# Patient Record
Sex: MALE | NOT HISPANIC OR LATINO | Employment: OTHER | URBAN - METROPOLITAN AREA
[De-identification: names, ages, dates, MRNs, and addresses within clinical notes are randomized per-mention and may not be internally consistent; named-entity substitution may affect disease eponyms.]

---

## 2023-09-06 ENCOUNTER — HOSPITAL ENCOUNTER (OUTPATIENT)
Dept: DATA CONVERSION | Facility: HOSPITAL | Age: 63
Discharge: HOME | End: 2023-09-06
Payer: COMMERCIAL

## 2023-09-06 DIAGNOSIS — R21 RASH AND OTHER NONSPECIFIC SKIN ERUPTION: ICD-10-CM

## 2023-09-06 DIAGNOSIS — R42 DIZZINESS AND GIDDINESS: ICD-10-CM

## 2023-09-06 DIAGNOSIS — Z12.5 ENCOUNTER FOR SCREENING FOR MALIGNANT NEOPLASM OF PROSTATE: ICD-10-CM

## 2023-09-06 LAB
ALBUMIN SERPL-MCNC: 4 GM/DL (ref 3.5–5)
ALBUMIN/GLOB SERPL: 1.3 RATIO (ref 1.5–3)
ALP BLD-CCNC: 91 U/L (ref 35–125)
ALT SERPL-CCNC: 33 U/L (ref 5–40)
ANION GAP SERPL CALCULATED.3IONS-SCNC: 10 MMOL/L (ref 0–19)
AST SERPL-CCNC: 30 U/L (ref 5–40)
BASOPHILS # BLD AUTO: 0.03 K/UL (ref 0–0.22)
BASOPHILS NFR BLD AUTO: 0.6 % (ref 0–1)
BILIRUB SERPL-MCNC: 0.7 MG/DL (ref 0.1–1.2)
BUN SERPL-MCNC: 27 MG/DL (ref 8–25)
BUN/CREAT SERPL: 30 RATIO (ref 8–21)
CALCIUM SERPL-MCNC: 8.9 MG/DL (ref 8.5–10.4)
CHLORIDE SERPL-SCNC: 106 MMOL/L (ref 97–107)
CO2 SERPL-SCNC: 24 MMOL/L (ref 24–31)
CREAT SERPL-MCNC: 0.9 MG/DL (ref 0.4–1.6)
DEPRECATED RDW RBC AUTO: 47.7 FL (ref 37–54)
DIFFERENTIAL METHOD BLD: ABNORMAL
EOSINOPHIL # BLD AUTO: 0.3 K/UL (ref 0–0.45)
EOSINOPHIL NFR BLD: 5.7 % (ref 0–3)
ERYTHROCYTE [DISTWIDTH] IN BLOOD BY AUTOMATED COUNT: 13.6 % (ref 11.7–15)
ERYTHROCYTE [SEDIMENTATION RATE] IN BLOOD BY WESTERGREN METHOD: 28 MM/HR (ref 0–20)
GFR SERPL CREATININE-BSD FRML MDRD: 97 ML/MIN/1.73 M2
GLOBULIN SER-MCNC: 3 G/DL (ref 1.9–3.7)
GLUCOSE SERPL-MCNC: 87 MG/DL (ref 65–99)
HCT VFR BLD AUTO: 43.5 % (ref 41–50)
HGB BLD-MCNC: 14.4 GM/DL (ref 13.5–16.5)
IMM GRANULOCYTES # BLD AUTO: 0.01 K/UL (ref 0–0.1)
LYMPHOCYTES # BLD AUTO: 1.64 K/UL (ref 1.2–3.2)
LYMPHOCYTES NFR BLD MANUAL: 31.2 % (ref 20–40)
MAGNESIUM SERPL-MCNC: 2.3 MG/DL (ref 1.6–3.1)
MCH RBC QN AUTO: 31.4 PG (ref 26–34)
MCHC RBC AUTO-ENTMCNC: 33.1 % (ref 31–37)
MCV RBC AUTO: 94.8 FL (ref 80–100)
MONOCYTES # BLD AUTO: 0.57 K/UL (ref 0–0.8)
MONOCYTES NFR BLD MANUAL: 10.8 % (ref 0–8)
NEUTROPHILS # BLD AUTO: 2.71 K/UL
NEUTROPHILS # BLD AUTO: 2.71 K/UL (ref 1.8–7.7)
NEUTROPHILS.IMMATURE NFR BLD: 0.2 % (ref 0–1)
NEUTS SEG NFR BLD: 51.5 % (ref 50–70)
NRBC BLD-RTO: 0 /100 WBC
PLATELET # BLD AUTO: 235 K/UL (ref 150–450)
PMV BLD AUTO: 11.6 CU (ref 7–12.6)
POTASSIUM SERPL-SCNC: 4.4 MMOL/L (ref 3.4–5.1)
PROT SERPL-MCNC: 7 G/DL (ref 5.9–7.9)
PSA SERPL-MCNC: 0.8 NG/ML (ref 0–4.1)
RBC # BLD AUTO: 4.59 M/UL (ref 4.5–5.5)
SODIUM SERPL-SCNC: 140 MMOL/L (ref 133–145)
WBC # BLD AUTO: 5.3 K/UL (ref 4.5–11)

## 2023-09-21 RX ORDER — HYDROCODONE BITARTRATE AND ACETAMINOPHEN 5; 325 MG/1; MG/1
TABLET ORAL
COMMUNITY
Start: 2023-09-05 | End: 2023-10-31 | Stop reason: ALTCHOICE

## 2023-09-21 RX ORDER — ACETAMINOPHEN 500 MG
500 TABLET ORAL EVERY 6 HOURS PRN
COMMUNITY
End: 2024-02-08 | Stop reason: WASHOUT

## 2023-10-25 ENCOUNTER — APPOINTMENT (OUTPATIENT)
Dept: PRIMARY CARE | Facility: CLINIC | Age: 63
End: 2023-10-25
Payer: COMMERCIAL

## 2023-10-30 RX ORDER — TRIAMCINOLONE ACETONIDE 1 MG/G
CREAM TOPICAL
COMMUNITY
Start: 2023-09-21 | End: 2023-10-31 | Stop reason: SDUPTHER

## 2023-10-31 ENCOUNTER — APPOINTMENT (OUTPATIENT)
Dept: PRIMARY CARE | Facility: CLINIC | Age: 63
End: 2023-10-31
Payer: COMMERCIAL

## 2023-10-31 ENCOUNTER — OFFICE VISIT (OUTPATIENT)
Dept: PRIMARY CARE | Facility: CLINIC | Age: 63
End: 2023-10-31
Payer: COMMERCIAL

## 2023-10-31 VITALS
OXYGEN SATURATION: 99 % | HEIGHT: 73 IN | HEART RATE: 57 BPM | TEMPERATURE: 97.5 F | SYSTOLIC BLOOD PRESSURE: 120 MMHG | BODY MASS INDEX: 29.66 KG/M2 | WEIGHT: 223.8 LBS | DIASTOLIC BLOOD PRESSURE: 72 MMHG

## 2023-10-31 DIAGNOSIS — Z00.00 WELL ADULT EXAM: ICD-10-CM

## 2023-10-31 DIAGNOSIS — E78.00 HYPERCHOLESTEROLEMIA: Primary | ICD-10-CM

## 2023-10-31 DIAGNOSIS — R21 RASH AND NONSPECIFIC SKIN ERUPTION: ICD-10-CM

## 2023-10-31 LAB
CHOLEST SERPL-MCNC: 248 MG/DL (ref 133–200)
CHOLEST/HDLC SERPL: 5.8 {RATIO}
HDLC SERPL-MCNC: 43 MG/DL
LDLC SERPL CALC-MCNC: 186 MG/DL (ref 65–130)
TRIGL SERPL-MCNC: 96 MG/DL (ref 40–150)

## 2023-10-31 PROCEDURE — 80061 LIPID PANEL: CPT

## 2023-10-31 PROCEDURE — 99396 PREV VISIT EST AGE 40-64: CPT | Performed by: NURSE PRACTITIONER

## 2023-10-31 PROCEDURE — 3008F BODY MASS INDEX DOCD: CPT | Performed by: NURSE PRACTITIONER

## 2023-10-31 PROCEDURE — 36415 COLL VENOUS BLD VENIPUNCTURE: CPT

## 2023-10-31 RX ORDER — TRIAMCINOLONE ACETONIDE 1 MG/G
CREAM TOPICAL 2 TIMES DAILY
Qty: 45 G | Refills: 3 | Status: SHIPPED | OUTPATIENT
Start: 2023-10-31 | End: 2024-01-22 | Stop reason: ALTCHOICE

## 2023-10-31 ASSESSMENT — ENCOUNTER SYMPTOMS
ALLERGIC/IMMUNOLOGIC NEGATIVE: 1
HEMATURIA: 0
SORE THROAT: 0
ACTIVITY CHANGE: 0
DIFFICULTY URINATING: 0
DIZZINESS: 0
CHEST TIGHTNESS: 0
DIARRHEA: 0
CARDIOVASCULAR NEGATIVE: 1
APPETITE CHANGE: 0
RESPIRATORY NEGATIVE: 1
CONSTIPATION: 0
WHEEZING: 0
LIGHT-HEADEDNESS: 0
AGITATION: 0
GASTROINTESTINAL NEGATIVE: 1
COUGH: 0
HEADACHES: 0
ARTHRALGIAS: 0
DYSURIA: 0
NERVOUS/ANXIOUS: 0
SHORTNESS OF BREATH: 0
EYES NEGATIVE: 1
FREQUENCY: 0
BRUISES/BLEEDS EASILY: 0
BLOOD IN STOOL: 0
ENDOCRINE NEGATIVE: 1
EYE DISCHARGE: 0
EYE REDNESS: 0
JOINT SWELLING: 0
OCCASIONAL FEELINGS OF UNSTEADINESS: 0
ADENOPATHY: 0
ABDOMINAL PAIN: 0
TREMORS: 0
PALPITATIONS: 0
NEUROLOGICAL NEGATIVE: 1
LOSS OF SENSATION IN FEET: 0
DEPRESSION: 0

## 2023-10-31 ASSESSMENT — PATIENT HEALTH QUESTIONNAIRE - PHQ9
SUM OF ALL RESPONSES TO PHQ9 QUESTIONS 1 AND 2: 0
2. FEELING DOWN, DEPRESSED OR HOPELESS: NOT AT ALL
1. LITTLE INTEREST OR PLEASURE IN DOING THINGS: NOT AT ALL

## 2023-10-31 ASSESSMENT — PAIN SCALES - GENERAL: PAINLEVEL: 0-NO PAIN

## 2023-10-31 NOTE — PROGRESS NOTES
"Subjective   Patient ID: Dirk Gibson is a 62 y.o. male who presents for Annual Exam.    Presents today for physical exam.  He denies new concerns today.  He reports that the dizziness and lightheadedness he was experiencing this summer has resolved with increase in fluid intake and decrease and spending time out in the heat.  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  He reports he is able to keep up well with the younger coworkers that he works with.         Review of Systems   Constitutional:  Negative for activity change and appetite change.   HENT:  Negative for congestion, ear pain, hearing loss and sore throat.    Eyes: Negative.  Negative for discharge, redness and visual disturbance.   Respiratory: Negative.  Negative for cough, chest tightness, shortness of breath and wheezing.    Cardiovascular: Negative.  Negative for chest pain, palpitations and leg swelling.   Gastrointestinal: Negative.  Negative for abdominal pain, blood in stool, constipation and diarrhea.   Endocrine: Negative.    Genitourinary: Negative.  Negative for difficulty urinating, dysuria, frequency, hematuria, penile discharge and testicular pain.   Musculoskeletal:  Negative for arthralgias and joint swelling.   Skin:  Negative for rash.   Allergic/Immunologic: Negative.    Neurological: Negative.  Negative for dizziness, tremors, light-headedness and headaches.   Hematological:  Negative for adenopathy. Does not bruise/bleed easily.   Psychiatric/Behavioral:  Negative for agitation. The patient is not nervous/anxious.        Objective   /72 (BP Location: Left arm, Patient Position: Sitting)   Pulse 57   Temp 36.4 °C (97.5 °F) (Temporal)   Ht 1.854 m (6' 1\")   Wt 102 kg (223 lb 12.8 oz)   SpO2 99%   BMI 29.53 kg/m²     Physical Exam  Constitutional:       General: He is not in acute distress.     Appearance: Normal appearance.   HENT:      Head: Normocephalic.      Right Ear: Tympanic membrane normal.      " Left Ear: Tympanic membrane normal.      Nose: Nose normal.      Mouth/Throat:      Mouth: Mucous membranes are moist.   Eyes:      Conjunctiva/sclera: Conjunctivae normal.      Pupils: Pupils are equal, round, and reactive to light.   Cardiovascular:      Rate and Rhythm: Normal rate and regular rhythm.      Pulses: Normal pulses.      Heart sounds: Normal heart sounds.   Pulmonary:      Effort: Pulmonary effort is normal.      Breath sounds: Normal breath sounds.   Abdominal:      General: Bowel sounds are normal.      Palpations: Abdomen is soft.   Musculoskeletal:         General: Normal range of motion.   Skin:     General: Skin is warm and dry.      Capillary Refill: Capillary refill takes less than 2 seconds.   Neurological:      Mental Status: He is alert and oriented to person, place, and time.   Psychiatric:         Mood and Affect: Mood normal.         Speech: Speech normal.         Assessment/Plan   Problem List Items Addressed This Visit    None  Visit Diagnoses         Codes    Hypercholesterolemia    -  Primary E78.00    Relevant Orders    Lipid panel    Rash and nonspecific skin eruption     R21    Relevant Medications    triamcinolone (Kenalog) 0.1 % cream    Well adult exam     Z00.00    Relevant Orders    Follow Up In Primary Care - Established          Labs obtained today.  Will address results when available.  Focus on healthy eating including lean proteins and vegetables.  Avoid frequent meals with high carbohydrate high sugar foods and drinks.  Try to increase physical activity.  Goal is for 160 minutes per week of physical activity.  Make sure to keep your fluid intake up.  Consider adding Gatorade or other electrolyte replacement fluids once a day especially on hot days where you are sweating a lot and working a lot.  Check blood pressure 2-3 times a week.  Call for blood pressures greater than 140/90.  Bring list of blood pressures to next visit.  Continue to use Kenalog cream as needed to  affected areas.  Call for worsening or not improving symptoms

## 2023-10-31 NOTE — PATIENT INSTRUCTIONS
Focus on healthy eating including lean proteins and vegetables.  Avoid high carbohydrate high sugar foods and drinks.  Try to increase physical activity as tolerated.  Goal is for 160 minutes/week of physical activity.  .  Make sure to keep your fluid intake up.  Consider adding Gatorade or other electrolyte replacement fluids once a day especially on hot days where you are sweating a lot and working a lot.  Check blood pressure 2-3 times a week.  Call for blood pressures greater than 140/90.  Bring list of blood pressures to next visit.  Labs obtained today, will address results when availableContinue to use Kenalog cream as needed to affected areas.  Call for worsening or not improving symptoms

## 2024-01-22 ENCOUNTER — OFFICE VISIT (OUTPATIENT)
Dept: PRIMARY CARE | Facility: CLINIC | Age: 64
End: 2024-01-22
Payer: COMMERCIAL

## 2024-01-22 ENCOUNTER — LAB (OUTPATIENT)
Dept: LAB | Facility: LAB | Age: 64
End: 2024-01-22
Payer: COMMERCIAL

## 2024-01-22 VITALS
BODY MASS INDEX: 29.55 KG/M2 | SYSTOLIC BLOOD PRESSURE: 134 MMHG | OXYGEN SATURATION: 99 % | DIASTOLIC BLOOD PRESSURE: 82 MMHG | WEIGHT: 224 LBS | HEART RATE: 68 BPM | TEMPERATURE: 97.8 F

## 2024-01-22 DIAGNOSIS — F17.210 CIGARETTE NICOTINE DEPENDENCE WITHOUT COMPLICATION: ICD-10-CM

## 2024-01-22 DIAGNOSIS — R42 DIZZINESS: ICD-10-CM

## 2024-01-22 DIAGNOSIS — R42 LIGHTHEADEDNESS: ICD-10-CM

## 2024-01-22 DIAGNOSIS — R53.83 OTHER FATIGUE: ICD-10-CM

## 2024-01-22 DIAGNOSIS — Z00.00 ENCOUNTER FOR MEDICAL EXAMINATION TO ESTABLISH CARE: ICD-10-CM

## 2024-01-22 DIAGNOSIS — Z00.00 ENCOUNTER FOR MEDICAL EXAMINATION TO ESTABLISH CARE: Primary | ICD-10-CM

## 2024-01-22 LAB
25(OH)D3 SERPL-MCNC: 35 NG/ML (ref 30–100)
ALBUMIN SERPL BCP-MCNC: 3.9 G/DL (ref 3.4–5)
ALP SERPL-CCNC: 76 U/L (ref 33–136)
ALT SERPL W P-5'-P-CCNC: 17 U/L (ref 10–52)
ANION GAP SERPL CALC-SCNC: 13 MMOL/L (ref 10–20)
AST SERPL W P-5'-P-CCNC: 17 U/L (ref 9–39)
BASOPHILS # BLD AUTO: 0.04 X10*3/UL (ref 0–0.1)
BASOPHILS NFR BLD AUTO: 0.5 %
BILIRUB SERPL-MCNC: 1.5 MG/DL (ref 0–1.2)
BUN SERPL-MCNC: 14 MG/DL (ref 6–23)
CALCIUM SERPL-MCNC: 9 MG/DL (ref 8.6–10.3)
CHLORIDE SERPL-SCNC: 101 MMOL/L (ref 98–107)
CHOLEST SERPL-MCNC: 229 MG/DL (ref 0–199)
CHOLESTEROL/HDL RATIO: 5.8
CO2 SERPL-SCNC: 30 MMOL/L (ref 21–32)
CREAT SERPL-MCNC: 0.9 MG/DL (ref 0.5–1.3)
EGFRCR SERPLBLD CKD-EPI 2021: >90 ML/MIN/1.73M*2
EOSINOPHIL # BLD AUTO: 0.36 X10*3/UL (ref 0–0.7)
EOSINOPHIL NFR BLD AUTO: 4.9 %
ERYTHROCYTE [DISTWIDTH] IN BLOOD BY AUTOMATED COUNT: 13.2 % (ref 11.5–14.5)
EST. AVERAGE GLUCOSE BLD GHB EST-MCNC: 120 MG/DL
GLUCOSE SERPL-MCNC: 95 MG/DL (ref 74–99)
HBA1C MFR BLD: 5.8 %
HCT VFR BLD AUTO: 46 % (ref 41–52)
HDLC SERPL-MCNC: 39.8 MG/DL
HGB BLD-MCNC: 15 G/DL (ref 13.5–17.5)
IMM GRANULOCYTES # BLD AUTO: 0.03 X10*3/UL (ref 0–0.7)
IMM GRANULOCYTES NFR BLD AUTO: 0.4 % (ref 0–0.9)
LDLC SERPL CALC-MCNC: 160 MG/DL
LYMPHOCYTES # BLD AUTO: 2.01 X10*3/UL (ref 1.2–4.8)
LYMPHOCYTES NFR BLD AUTO: 27.2 %
MCH RBC QN AUTO: 30.2 PG (ref 26–34)
MCHC RBC AUTO-ENTMCNC: 32.6 G/DL (ref 32–36)
MCV RBC AUTO: 93 FL (ref 80–100)
MONOCYTES # BLD AUTO: 0.68 X10*3/UL (ref 0.1–1)
MONOCYTES NFR BLD AUTO: 9.2 %
NEUTROPHILS # BLD AUTO: 4.28 X10*3/UL (ref 1.2–7.7)
NEUTROPHILS NFR BLD AUTO: 57.8 %
NON HDL CHOLESTEROL: 189 MG/DL (ref 0–149)
NRBC BLD-RTO: 0 /100 WBCS (ref 0–0)
PLATELET # BLD AUTO: 261 X10*3/UL (ref 150–450)
POTASSIUM SERPL-SCNC: 4.5 MMOL/L (ref 3.5–5.3)
PROT SERPL-MCNC: 6.1 G/DL (ref 6.4–8.2)
RBC # BLD AUTO: 4.97 X10*6/UL (ref 4.5–5.9)
SODIUM SERPL-SCNC: 139 MMOL/L (ref 136–145)
TRIGL SERPL-MCNC: 144 MG/DL (ref 0–149)
TSH SERPL-ACNC: 1.39 MIU/L (ref 0.44–3.98)
VIT B12 SERPL-MCNC: 233 PG/ML (ref 211–911)
VLDL: 29 MG/DL (ref 0–40)
WBC # BLD AUTO: 7.4 X10*3/UL (ref 4.4–11.3)

## 2024-01-22 PROCEDURE — 84443 ASSAY THYROID STIM HORMONE: CPT

## 2024-01-22 PROCEDURE — 3008F BODY MASS INDEX DOCD: CPT | Performed by: STUDENT IN AN ORGANIZED HEALTH CARE EDUCATION/TRAINING PROGRAM

## 2024-01-22 PROCEDURE — 99406 BEHAV CHNG SMOKING 3-10 MIN: CPT | Performed by: STUDENT IN AN ORGANIZED HEALTH CARE EDUCATION/TRAINING PROGRAM

## 2024-01-22 PROCEDURE — 80061 LIPID PANEL: CPT

## 2024-01-22 PROCEDURE — 36415 COLL VENOUS BLD VENIPUNCTURE: CPT

## 2024-01-22 PROCEDURE — 85025 COMPLETE CBC W/AUTO DIFF WBC: CPT

## 2024-01-22 PROCEDURE — 80053 COMPREHEN METABOLIC PANEL: CPT

## 2024-01-22 PROCEDURE — 99214 OFFICE O/P EST MOD 30 MIN: CPT | Performed by: STUDENT IN AN ORGANIZED HEALTH CARE EDUCATION/TRAINING PROGRAM

## 2024-01-22 PROCEDURE — 82306 VITAMIN D 25 HYDROXY: CPT

## 2024-01-22 PROCEDURE — 83036 HEMOGLOBIN GLYCOSYLATED A1C: CPT

## 2024-01-22 PROCEDURE — 82607 VITAMIN B-12: CPT

## 2024-01-30 ENCOUNTER — DOCUMENTATION (OUTPATIENT)
Dept: PRIMARY CARE | Facility: CLINIC | Age: 64
End: 2024-01-30
Payer: COMMERCIAL

## 2024-01-30 ENCOUNTER — APPOINTMENT (OUTPATIENT)
Dept: RADIOLOGY | Facility: HOSPITAL | Age: 64
End: 2024-01-30
Payer: COMMERCIAL

## 2024-01-30 ENCOUNTER — HOSPITAL ENCOUNTER (OUTPATIENT)
Dept: VASCULAR MEDICINE | Facility: HOSPITAL | Age: 64
Discharge: HOME | End: 2024-01-30
Payer: COMMERCIAL

## 2024-01-30 ENCOUNTER — HOSPITAL ENCOUNTER (OUTPATIENT)
Dept: RADIOLOGY | Facility: HOSPITAL | Age: 64
Discharge: HOME | End: 2024-01-30
Payer: COMMERCIAL

## 2024-01-30 ENCOUNTER — HOSPITAL ENCOUNTER (EMERGENCY)
Facility: HOSPITAL | Age: 64
Discharge: HOME | End: 2024-01-30
Attending: STUDENT IN AN ORGANIZED HEALTH CARE EDUCATION/TRAINING PROGRAM
Payer: COMMERCIAL

## 2024-01-30 VITALS
WEIGHT: 224 LBS | TEMPERATURE: 97.9 F | SYSTOLIC BLOOD PRESSURE: 148 MMHG | DIASTOLIC BLOOD PRESSURE: 84 MMHG | OXYGEN SATURATION: 96 % | HEART RATE: 60 BPM | RESPIRATION RATE: 18 BRPM | BODY MASS INDEX: 30.34 KG/M2 | HEIGHT: 72 IN

## 2024-01-30 DIAGNOSIS — F17.210 CIGARETTE NICOTINE DEPENDENCE WITHOUT COMPLICATION: ICD-10-CM

## 2024-01-30 DIAGNOSIS — I65.23 CAROTID STENOSIS, BILATERAL: Primary | ICD-10-CM

## 2024-01-30 DIAGNOSIS — R42 DIZZINESS: ICD-10-CM

## 2024-01-30 LAB
ALBUMIN SERPL BCP-MCNC: 4.1 G/DL (ref 3.4–5)
ANION GAP SERPL CALC-SCNC: 14 MMOL/L (ref 10–20)
BASOPHILS # BLD AUTO: 0.04 X10*3/UL (ref 0–0.1)
BASOPHILS NFR BLD AUTO: 0.6 %
BUN SERPL-MCNC: 14 MG/DL (ref 6–23)
CALCIUM SERPL-MCNC: 9.1 MG/DL (ref 8.6–10.3)
CHLORIDE SERPL-SCNC: 100 MMOL/L (ref 98–107)
CO2 SERPL-SCNC: 31 MMOL/L (ref 21–32)
CREAT SERPL-MCNC: 0.86 MG/DL (ref 0.5–1.3)
EGFRCR SERPLBLD CKD-EPI 2021: >90 ML/MIN/1.73M*2
EOSINOPHIL # BLD AUTO: 0.47 X10*3/UL (ref 0–0.7)
EOSINOPHIL NFR BLD AUTO: 6.7 %
ERYTHROCYTE [DISTWIDTH] IN BLOOD BY AUTOMATED COUNT: 13.3 % (ref 11.5–14.5)
GLUCOSE SERPL-MCNC: 77 MG/DL (ref 74–99)
HCT VFR BLD AUTO: 44.5 % (ref 41–52)
HGB BLD-MCNC: 14.9 G/DL (ref 13.5–17.5)
IMM GRANULOCYTES # BLD AUTO: 0.02 X10*3/UL (ref 0–0.7)
IMM GRANULOCYTES NFR BLD AUTO: 0.3 % (ref 0–0.9)
LYMPHOCYTES # BLD AUTO: 2.22 X10*3/UL (ref 1.2–4.8)
LYMPHOCYTES NFR BLD AUTO: 31.9 %
MCH RBC QN AUTO: 30.6 PG (ref 26–34)
MCHC RBC AUTO-ENTMCNC: 33.5 G/DL (ref 32–36)
MCV RBC AUTO: 91 FL (ref 80–100)
MONOCYTES # BLD AUTO: 0.69 X10*3/UL (ref 0.1–1)
MONOCYTES NFR BLD AUTO: 9.9 %
NEUTROPHILS # BLD AUTO: 3.53 X10*3/UL (ref 1.2–7.7)
NEUTROPHILS NFR BLD AUTO: 50.6 %
NRBC BLD-RTO: 0 /100 WBCS (ref 0–0)
PHOSPHATE SERPL-MCNC: 4.4 MG/DL (ref 2.5–4.9)
PLATELET # BLD AUTO: 264 X10*3/UL (ref 150–450)
POTASSIUM SERPL-SCNC: 4.1 MMOL/L (ref 3.5–5.3)
RBC # BLD AUTO: 4.87 X10*6/UL (ref 4.5–5.9)
SODIUM SERPL-SCNC: 141 MMOL/L (ref 136–145)
WBC # BLD AUTO: 7 X10*3/UL (ref 4.4–11.3)

## 2024-01-30 PROCEDURE — 2550000001 HC RX 255 CONTRASTS: Performed by: STUDENT IN AN ORGANIZED HEALTH CARE EDUCATION/TRAINING PROGRAM

## 2024-01-30 PROCEDURE — 99284 EMERGENCY DEPT VISIT MOD MDM: CPT | Performed by: STUDENT IN AN ORGANIZED HEALTH CARE EDUCATION/TRAINING PROGRAM

## 2024-01-30 PROCEDURE — 71271 CT THORAX LUNG CANCER SCR C-: CPT | Performed by: RADIOLOGY

## 2024-01-30 PROCEDURE — 70498 CT ANGIOGRAPHY NECK: CPT

## 2024-01-30 PROCEDURE — 70496 CT ANGIOGRAPHY HEAD: CPT | Performed by: RADIOLOGY

## 2024-01-30 PROCEDURE — 71271 CT THORAX LUNG CANCER SCR C-: CPT

## 2024-01-30 PROCEDURE — 36415 COLL VENOUS BLD VENIPUNCTURE: CPT | Performed by: STUDENT IN AN ORGANIZED HEALTH CARE EDUCATION/TRAINING PROGRAM

## 2024-01-30 PROCEDURE — 93880 EXTRACRANIAL BILAT STUDY: CPT | Performed by: SURGERY

## 2024-01-30 PROCEDURE — 70498 CT ANGIOGRAPHY NECK: CPT | Performed by: RADIOLOGY

## 2024-01-30 PROCEDURE — 93880 EXTRACRANIAL BILAT STUDY: CPT

## 2024-01-30 PROCEDURE — 85025 COMPLETE CBC W/AUTO DIFF WBC: CPT | Performed by: STUDENT IN AN ORGANIZED HEALTH CARE EDUCATION/TRAINING PROGRAM

## 2024-01-30 PROCEDURE — 80069 RENAL FUNCTION PANEL: CPT | Performed by: STUDENT IN AN ORGANIZED HEALTH CARE EDUCATION/TRAINING PROGRAM

## 2024-01-30 PROCEDURE — 70496 CT ANGIOGRAPHY HEAD: CPT

## 2024-01-30 RX ORDER — NAPROXEN SODIUM 220 MG/1
81 TABLET, FILM COATED ORAL DAILY
Qty: 360 TABLET | Refills: 0 | Status: SHIPPED | OUTPATIENT
Start: 2024-01-30 | End: 2025-01-29

## 2024-01-30 RX ORDER — ATORVASTATIN CALCIUM 10 MG/1
40 TABLET, FILM COATED ORAL DAILY
Qty: 80 TABLET | Refills: 0 | Status: SHIPPED | OUTPATIENT
Start: 2024-01-30 | End: 2024-02-08 | Stop reason: WASHOUT

## 2024-01-30 RX ORDER — CLOPIDOGREL BISULFATE 75 MG/1
75 TABLET ORAL DAILY
Qty: 20 TABLET | Refills: 0 | Status: SHIPPED | OUTPATIENT
Start: 2024-01-30 | End: 2024-02-19 | Stop reason: SDUPTHER

## 2024-01-30 RX ADMIN — IOHEXOL 84 ML: 350 INJECTION, SOLUTION INTRAVENOUS at 19:34

## 2024-01-30 ASSESSMENT — LIFESTYLE VARIABLES
EVER FELT BAD OR GUILTY ABOUT YOUR DRINKING: NO
HAVE PEOPLE ANNOYED YOU BY CRITICIZING YOUR DRINKING: NO
EVER HAD A DRINK FIRST THING IN THE MORNING TO STEADY YOUR NERVES TO GET RID OF A HANGOVER: NO
HAVE YOU EVER FELT YOU SHOULD CUT DOWN ON YOUR DRINKING: NO

## 2024-01-30 ASSESSMENT — COLUMBIA-SUICIDE SEVERITY RATING SCALE - C-SSRS
6. HAVE YOU EVER DONE ANYTHING, STARTED TO DO ANYTHING, OR PREPARED TO DO ANYTHING TO END YOUR LIFE?: NO
1. IN THE PAST MONTH, HAVE YOU WISHED YOU WERE DEAD OR WISHED YOU COULD GO TO SLEEP AND NOT WAKE UP?: NO
2. HAVE YOU ACTUALLY HAD ANY THOUGHTS OF KILLING YOURSELF?: NO

## 2024-01-30 ASSESSMENT — PAIN - FUNCTIONAL ASSESSMENT: PAIN_FUNCTIONAL_ASSESSMENT: 0-10

## 2024-01-30 ASSESSMENT — PAIN SCALES - GENERAL: PAINLEVEL_OUTOF10: 0 - NO PAIN

## 2024-01-30 NOTE — ED TRIAGE NOTES
Patient here for dizziness. Hasn't been dizzy for 2 weeks but had carotid ultrasound done and was had 70% carotid stenosis bilaterally

## 2024-01-30 NOTE — PROGRESS NOTES
Spoke with patient's wife, who is the emergency contact, regarding Dirk's carotid ultrasound results, which showed bilateral 70% stenosis. Given the fact he is symptomatic with lightheadedness and dizziness, I recommend he goes to the ER for further evaluation. Patient agrees to plan. Offered to call EMS but patient declined. [facility] notified that patient will be coming in for the reason stated above. Instructed patient to follow up in office within one week of ER visit and/or discharge.     Sher Helton MD

## 2024-01-30 NOTE — ED PROVIDER NOTES
HPI   Chief Complaint   Patient presents with    Dizziness     Hasn't been dizzy for 2 weeks but had carotid ultrasound done and was had 70% carotid stenosis bilaterally        HPI  63-year-old male with history of significant smoking history (~1 PPD x 40 years) who presents after abnormal radiology result.  Patient reports intermittent lightheadedness over the last 6 months.  He states the symptoms typically brought on by exertion and describes them as lightheadedness like he might pass out in addition to intermittent tongue numbness, general weakness.  Given the symptoms, patient had carotid duplex as an outpatient earlier today that revealed greater than 70% stenosis of the bilateral carotid arteries for which his PCP instructed him to present to the emergency department.  Patient denies any lightheadedness or similar symptoms at the moment.  He states he has been asymptomatic for the last 3 weeks.  He denies any headache, weakness, numbness, tingling.                  [place] Coma Scale Score: 15                  Patient History   No past medical history on file.  Past Surgical History:   Procedure Laterality Date    HAND SURGERY Left      Family History   Problem Relation Name Age of Onset    No Known Problems Mother      Leukemia Father      Diabetes Father      Cancer Father      No Known Problems Sister      No Known Problems Brother      No Known Problems Daughter      No Known Problems Son      No Known Problems Son      No Known Problems Son      No Known Problems Son      No Known Problems Maternal Grandmother      No Known Problems Maternal Grandfather      No Known Problems Paternal Grandmother      No Known Problems Paternal Grandfather       Social History     Tobacco Use    Smoking status: Every Day     Packs/day: 1     Types: Cigarettes     Start date: 1/1/1970    Smokeless tobacco: Never   Vaping Use    Vaping Use: Never used   Substance Use Topics    Alcohol use: Not Currently    Drug use: Never        Physical Exam   ED Triage Vitals [01/30/24 1515]   Temperature Heart Rate Respirations BP   36.6 °C (97.9 °F) 62 18 147/76      Pulse Ox Temp Source Heart Rate Source Patient Position   98 % Skin Monitor Lying      BP Location FiO2 (%)     Right arm --       Physical Exam  Vitals and nursing note reviewed.   Constitutional:       Appearance: Normal appearance.   HENT:      Head: Normocephalic.      Mouth/Throat:      Mouth: Mucous membranes are moist.   Eyes:      Conjunctiva/sclera: Conjunctivae normal.   Cardiovascular:      Rate and Rhythm: Normal rate.   Pulmonary:      Effort: Pulmonary effort is normal.   Abdominal:      General: Abdomen is flat.   Neurological:      Mental Status: He is alert and oriented to person, place, and time.      Cranial Nerves: No cranial nerve deficit.      Sensory: No sensory deficit.      Motor: No weakness.      Coordination: Coordination normal. Finger-Nose-Finger Test and Heel to Shin Test normal.      Gait: Gait normal.   Psychiatric:         Mood and Affect: Mood normal.         ED Course & MDM   ED Course as of 01/30/24 2238   Tue Jan 30, 2024 2129 CT angio head and neck w and wo IV contrast  Critical stenosis of the proximal left cervical ICA up to 99% as well as ~50% stenosis of the right cervical ICA [ARAM]      ED Course User Index  [ARAM] Ricardo Gonzalez, DO         Diagnoses as of 01/30/24 2238   Carotid stenosis, bilateral       Medical Decision Making  63-year-old male with history of 40-pack-year smoking history who presents after an outpatient carotid duplex revealed bilateral carotid artery stenoses greater than 70%.  Patient has had intermittent symptoms over the last 6 months including lightheadedness, intermittent tongue numbness, occasional dysmetria but patient reports no symptoms over the last 3 weeks and denies any symptoms at the time my exam.  On exam, patient is mildly hypertensive but otherwise vitals within normal limits, he is in no acute distress  and nontoxic-appearing, neurologically he is intact with no dysdiadochokinesia, no dysmetria, no aphasia or dysarthria.  We discussed the patient's symptoms and outpatient testing with on-call vascular surgeon, Dr. Umaña, who recommended CT angio head and neck with plan to see the patient as an outpatient following.    CT angio showed up to 99% stenosis of the left cervical ICA as well as approximate 50% stenosis of the right cervical ICA.  Findings relayed to Dr. Umaña and patient instructed to follow-up as soon as possible.  Patient started on aspirin, clopidogrel, atorvastatin, per Dr. Umaña's recommendations and discharged home in stable condition with instructions to return for any strokelike symptoms.    Procedure  Procedures     Ricardo Gonzalez,   Resident  01/30/24 9765

## 2024-01-30 NOTE — DISCHARGE INSTRUCTIONS
You presented to the emergency department after your carotid ultrasound showed carotid artery stenosis on both sides.  We spoke with the vascular surgeon, Dr. Joe Umaña, who asked that we obtained a CT of your head and neck and started you on aspirin, Plavix, atorvastatin in the meantime.  The CT imaging showed 99% stenosis of the left internal carotid artery and about 50% stenosis of the right carotid artery.    You should follow-up with Dr. Umaña as soon as you can by scheduling appointment at [phone].  We made his staff aware of you so they may already be in the process of scheduling appointment.  Please return to the emergency department if you have any strokelike symptoms such as facial droop, slurred speech, arm weakness, or for any other reason.

## 2024-02-01 DIAGNOSIS — I65.23 SYMPTOMATIC STENOSIS OF BOTH CAROTID ARTERIES: Primary | ICD-10-CM

## 2024-02-07 ENCOUNTER — APPOINTMENT (OUTPATIENT)
Dept: VASCULAR SURGERY | Facility: HOSPITAL | Age: 64
End: 2024-02-07
Payer: COMMERCIAL

## 2024-02-08 ENCOUNTER — APPOINTMENT (OUTPATIENT)
Dept: RADIOLOGY | Facility: HOSPITAL | Age: 64
End: 2024-02-08
Payer: COMMERCIAL

## 2024-02-08 ENCOUNTER — APPOINTMENT (OUTPATIENT)
Dept: VASCULAR MEDICINE | Facility: HOSPITAL | Age: 64
End: 2024-02-08
Payer: COMMERCIAL

## 2024-02-08 ENCOUNTER — OFFICE VISIT (OUTPATIENT)
Dept: PRIMARY CARE | Facility: CLINIC | Age: 64
End: 2024-02-08
Payer: COMMERCIAL

## 2024-02-08 VITALS
BODY MASS INDEX: 31.69 KG/M2 | HEIGHT: 72 IN | HEART RATE: 56 BPM | TEMPERATURE: 98.7 F | SYSTOLIC BLOOD PRESSURE: 110 MMHG | DIASTOLIC BLOOD PRESSURE: 68 MMHG | WEIGHT: 234 LBS | OXYGEN SATURATION: 98 %

## 2024-02-08 DIAGNOSIS — R93.89 ABNORMAL CT OF THE CHEST: ICD-10-CM

## 2024-02-08 DIAGNOSIS — E78.00 HYPERCHOLESTEROLEMIA: Primary | ICD-10-CM

## 2024-02-08 PROCEDURE — 99214 OFFICE O/P EST MOD 30 MIN: CPT | Performed by: STUDENT IN AN ORGANIZED HEALTH CARE EDUCATION/TRAINING PROGRAM

## 2024-02-08 PROCEDURE — 3008F BODY MASS INDEX DOCD: CPT | Performed by: STUDENT IN AN ORGANIZED HEALTH CARE EDUCATION/TRAINING PROGRAM

## 2024-02-08 RX ORDER — ROSUVASTATIN CALCIUM 20 MG/1
20 TABLET, COATED ORAL DAILY
Qty: 90 TABLET | Refills: 1 | Status: SHIPPED | OUTPATIENT
Start: 2024-02-08 | End: 2024-03-03 | Stop reason: HOSPADM

## 2024-02-09 NOTE — PROGRESS NOTES
Subjective   Patient ID: Dirk Gibson is a 63 y.o. male who presents for No chief complaint on file..  HPI  He is a 63 years old man who presented today to the office for follow-up carotid stenosis  Patient mentioned that has been symptomatic recently because he has not been very active.  When he is active his symptoms starting with lightheaded and dizziness.    Denies new onset headaches, fever, chills, n/v/d, chest pain, SOB, abdominal pain, urinary symptoms, and lower extremity edema.     Review of Systems  All other systems have been reviewed and are negative.    Visit Vitals  /68   Pulse 56   Temp 37.1 °C (98.7 °F)   Ht 1.829 m (6')   Wt 106 kg (234 lb)   SpO2 98%   BMI 31.74 kg/m²   Smoking Status Former   BSA 2.32 m²       Objective   Physical Exam  General: Alert and oriented. Appears well-nourished and in no acute distress.  Eyes: PERRLA. EOMI.  Head/neck: Normocephalic. Supple.  Lymphatics: No cervical lymphadenopathy.  Respiratory/Thorax: Clear to auscultation bilaterally. No wheezing.   Cardiovascular: Regular rate and rhythm. No murmurs.  Gastrointestinal: Soft, nontender, nondistended. +BS   Musculoskeletal: ROM intact. No joint swelling. Normal strength   Extremities: Warm and well perfused. No peripheral edema.  Neurological: No gross neurologic deficits.   Psychological: Appropriate mood and affect.   Skin: No visible rashes or lesions.     Assessment/Plan   He is 63 year old male who presented today for follow up on carotid stenosis.     # carotid stenosis  -patient had carotid ultrasound and ct angiogram which were positive for bilateral stenosis.  -Ordered today rosuvastatin 20 mg  -Patient on aspirin 81 mg and Plavix 75 mg daily  -Follow-up appointment with neuro surgeon for possibly intervention    # Lung nodule  -Recommendation from the previous CT scan were to follow-up with CT scan in 3 months  -Order CT scan in the future    # Health maintenance  -Patient declined all  immunization  -Decline colonoscopy.    No red flags. Follow up in  3 months to discuss the results of CT scan and CMP since he is on rosuvastatin    Problem List Items Addressed This Visit    None  Visit Diagnoses       Hypercholesterolemia    -  Primary    Relevant Medications    rosuvastatin (Crestor) 20 mg tablet    Other Relevant Orders    Comprehensive metabolic panel    Abnormal CT of the chest        Relevant Orders    CT chest w IV contrast            I have personally reviewed all available pertinent labs, imaging, and consult notes with the patient.     All questions and concerns were addressed. Patient verbalizes understanding instructions and agrees with established plan of care.     I discussed the plan with Dr.Zen Vera Rodriguez MD  Family medicine resident  PGY2

## 2024-02-13 ENCOUNTER — APPOINTMENT (OUTPATIENT)
Dept: VASCULAR SURGERY | Facility: CLINIC | Age: 64
End: 2024-02-13
Payer: COMMERCIAL

## 2024-02-15 ENCOUNTER — OFFICE VISIT (OUTPATIENT)
Dept: VASCULAR SURGERY | Facility: CLINIC | Age: 64
End: 2024-02-15
Payer: COMMERCIAL

## 2024-02-15 VITALS — BODY MASS INDEX: 31.74 KG/M2 | WEIGHT: 234 LBS | DIASTOLIC BLOOD PRESSURE: 78 MMHG | SYSTOLIC BLOOD PRESSURE: 122 MMHG

## 2024-02-15 DIAGNOSIS — Z72.0 TOBACCO ABUSE: ICD-10-CM

## 2024-02-15 DIAGNOSIS — I65.23 BILATERAL CAROTID ARTERY STENOSIS: Primary | ICD-10-CM

## 2024-02-15 PROCEDURE — 3008F BODY MASS INDEX DOCD: CPT | Performed by: NURSE PRACTITIONER

## 2024-02-15 PROCEDURE — 99213 OFFICE O/P EST LOW 20 MIN: CPT | Performed by: NURSE PRACTITIONER

## 2024-02-15 PROCEDURE — 99203 OFFICE O/P NEW LOW 30 MIN: CPT | Performed by: NURSE PRACTITIONER

## 2024-02-15 ASSESSMENT — ENCOUNTER SYMPTOMS
LOSS OF SENSATION IN FEET: 0
DEPRESSION: 0
OCCASIONAL FEELINGS OF UNSTEADINESS: 0

## 2024-02-15 ASSESSMENT — PAIN SCALES - GENERAL: PAINLEVEL: 0-NO PAIN

## 2024-02-15 NOTE — PROGRESS NOTES
"Dirk Gibson [birth year]  NPV Carotid Artery Stenosis     On ASA, Plavix and statin    Patient evaluated with his wife present.  He endorses episodes of lightheadedness at work which prompted him to be seen by his primary care physician who then ordered a carotid duplex and a CT angio head and neck.     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  He also had 1 episode where his right leg fell asleep for several hours.  His wife states that when he had the episodes of lightheadedness he would also have increased confusion and \"garbled speech \", he also had episodes of stumbling.  He has not had any episodes since January but he also has not been working.  He was started on aspirin and Plavix which she is currently taking.  He denies amaurosis fugax, unilateral weakness or difficulty with speech on my exam.  Patient is right-handed.  45-pack-year smoker quit in January of this year.      Constitutional:  Alert and oriented to person, place, date/time in no acute distress.  HEENT:  Atraumatic, normocephalic. PERRL. EOMI.  Nares patent.  Mucous membranes moist.  Smile symmetric.  Tongue slightly deviates to the right.   Neck:  Trachea midline.  Respiratory:  Clear to auscultation.  Cardiac:  Regular rate and rhythm.  No murmurs.  Cardiovascular:  No edema of the extremities.  Pulse exam: Radial and femoral pulses palpable bilateral.   Abdominal:  Soft, nontender, nondistended, bowel sounds present.  Musculoskeletal:  Moves extremities freely.  Muscle grade strength 5 out of 5 bilaterally  Dermatological: Clean and dry   Neurological: Alert and oriented to person, place, date/time  Psych:  Calm, cooperative      1/30/24:  Carotid Duplex**CRITICAL RESULT**  Critical Result: > 70% stenosis bilateral ICA  Notification called to Dr. Helton on 1/30/2024 at 12:01:54 PM by Edith JTEER.     CONCLUSIONS:  Right Carotid: Findings are consistent with greater " than 70% stenosis of the right proximal internal carotid artery. Turbulent flow seen by color Doppler. Right external carotid artery appears patent with no evidence of stenosis. The right vertebral artery is patent with antegrade flow. There are elevated velocities in the right subclavian artery that are suggestive of disease.  Left Carotid: Findings are consistent with greater than 70% stenosis of the left proximal internal carotid artery. Turbulent flow seen by color Doppler. Left mid and distal ICA waveforms appear pre-occlusive. Additional imaging if clinically indicated. Left external carotid artery appears patent with no evidence of stenosis. The left vertebral artery is patent with antegrade flow. There are elevated velocities in the left subclavian artery that are suggestive of disease. Monophasic waveforms with low flow noted in mid and distal left ICA. Stenosis may be underestimated due to calcified shoadowing plaque.     Imaging & Doppler Findings:  Right Plaque Morph: The proximal right internal carotid artery demonstrates heterogenous and calcified plaque. The proximal right external carotid artery demonstrates heterogenous and calcified plaque. The distal right common carotid artery demonstrates heterogenous and calcified plaque.  Left Plaque Morph: The proximal left internal carotid artery demonstrates heterogenous and calcified plaque. The mid left internal carotid artery demonstrates heterogenous plaque. The proximal left external carotid artery demonstrates heterogenous and calcified plaque. The distal left common carotid artery demonstrates heterogenous and calcified plaque.      Right                         Left    PSV      EDV                 PSV      EDV  145 cm/s            CCA P    120 cm/s  97 cm/s             CCA D    80 cm/s  550 cm/s 166 cm/s   ICA P    479 cm/s 179 cm/s  280 cm/s            ICA M    21 cm/s  107 cm/s            ICA D    17 cm/s  158 cm/s             ECA     136 cm/s  77  cm/s           Vertebral  63 cm/s  261 cm/s          Subclavian 256 cm/s                   Right Left  ICA/CCA Ratio  5.7  6.0      1/30/24 CTA Head and Neck    Right carotid vessels: The common carotid artery is normal.  Atherosclerotic changes of carotid bulb. The internal carotid artery  in the neck is normal. There is 50% stenosis  by NASCET criteria.      Left carotid vessels: The common carotid artery is normal.  Atherosclerotic changes of the carotid bulb noted. Severe  atherosclerotic disease present within the proximal left cervical ICA  over a course of 2.2 cm with near complete occlusion with  noncalcified plaque noted (series 507, image 57). The mid to distal  left cervical ICA is diffusely diminutive in caliber to moderate  degree.      Vertebral vessels: Asymmetric diminutive left vertebral artery which  may be developmental. Otherwise the visualized segments of the  cervical vertebral arteries are patent.      There is critical stenosis of the proximal left cervical ICA (up to  99%) with partial reconstitution and asymmetric diminutive caliber of  the mid to distal left cervical ICA. Vascular consult recommended.      Approximately 50% stenosis of the right cervical ICA.      No evidence for significant stenosis or large branch vessel cutoffs  of the intracranial vessels.    Assessment and plan  Carotid artery stenosis      Ultrasound and CT angio with runoff reviewed by Dr. Umaña on 2/14/2024.  Patient's left proximal ICA is almost occluded.  Right proximal ICA velocity 550/166, mid ICA to 80, distal , ratio 5.7.  Our office will reach out to patient regarding PAT plan is for right carotid endarterectomy, likely next week.  Patient to continue current medications.  Avoid strenuous activity.  Blood pressure control.  Continue smoking cessation.  Our office will reach out to the patient regarding preadmission testing and surgical date.  Patient and wife wish to forego virtual meeting with   [place]

## 2024-02-15 NOTE — H&P (VIEW-ONLY)
"Dirk Gibson [birth year]  NPV Carotid Artery Stenosis     On ASA, Plavix and statin    Patient evaluated with his wife present.  He endorses episodes of lightheadedness at work which prompted him to be seen by his primary care physician who then ordered a carotid duplex and a CT angio head and neck.     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  He also had 1 episode where his right leg fell asleep for several hours.  His wife states that when he had the episodes of lightheadedness he would also have increased confusion and \"garbled speech \", he also had episodes of stumbling.  He has not had any episodes since January but he also has not been working.  He was started on aspirin and Plavix which she is currently taking.  He denies amaurosis fugax, unilateral weakness or difficulty with speech on my exam.  Patient is right-handed.  45-pack-year smoker quit in January of this year.      Constitutional:  Alert and oriented to person, place, date/time in no acute distress.  HEENT:  Atraumatic, normocephalic. PERRL. EOMI.  Nares patent.  Mucous membranes moist.  Smile symmetric.  Tongue slightly deviates to the right.   Neck:  Trachea midline.  Respiratory:  Clear to auscultation.  Cardiac:  Regular rate and rhythm.  No murmurs.  Cardiovascular:  No edema of the extremities.  Pulse exam: Radial and femoral pulses palpable bilateral.   Abdominal:  Soft, nontender, nondistended, bowel sounds present.  Musculoskeletal:  Moves extremities freely.  Muscle grade strength 5 out of 5 bilaterally  Dermatological: Clean and dry   Neurological: Alert and oriented to person, place, date/time  Psych:  Calm, cooperative      1/30/24:  Carotid Duplex**CRITICAL RESULT**  Critical Result: > 70% stenosis bilateral ICA  Notification called to Dr. Helton on 1/30/2024 at 12:01:54 PM by Edith JETER.     CONCLUSIONS:  Right Carotid: Findings are consistent with greater " than 70% stenosis of the right proximal internal carotid artery. Turbulent flow seen by color Doppler. Right external carotid artery appears patent with no evidence of stenosis. The right vertebral artery is patent with antegrade flow. There are elevated velocities in the right subclavian artery that are suggestive of disease.  Left Carotid: Findings are consistent with greater than 70% stenosis of the left proximal internal carotid artery. Turbulent flow seen by color Doppler. Left mid and distal ICA waveforms appear pre-occlusive. Additional imaging if clinically indicated. Left external carotid artery appears patent with no evidence of stenosis. The left vertebral artery is patent with antegrade flow. There are elevated velocities in the left subclavian artery that are suggestive of disease. Monophasic waveforms with low flow noted in mid and distal left ICA. Stenosis may be underestimated due to calcified shoadowing plaque.     Imaging & Doppler Findings:  Right Plaque Morph: The proximal right internal carotid artery demonstrates heterogenous and calcified plaque. The proximal right external carotid artery demonstrates heterogenous and calcified plaque. The distal right common carotid artery demonstrates heterogenous and calcified plaque.  Left Plaque Morph: The proximal left internal carotid artery demonstrates heterogenous and calcified plaque. The mid left internal carotid artery demonstrates heterogenous plaque. The proximal left external carotid artery demonstrates heterogenous and calcified plaque. The distal left common carotid artery demonstrates heterogenous and calcified plaque.      Right                         Left    PSV      EDV                 PSV      EDV  145 cm/s            CCA P    120 cm/s  97 cm/s             CCA D    80 cm/s  550 cm/s 166 cm/s   ICA P    479 cm/s 179 cm/s  280 cm/s            ICA M    21 cm/s  107 cm/s            ICA D    17 cm/s  158 cm/s             ECA     136 cm/s  77  cm/s           Vertebral  63 cm/s  261 cm/s          Subclavian 256 cm/s                   Right Left  ICA/CCA Ratio  5.7  6.0      1/30/24 CTA Head and Neck    Right carotid vessels: The common carotid artery is normal.  Atherosclerotic changes of carotid bulb. The internal carotid artery  in the neck is normal. There is 50% stenosis  by NASCET criteria.      Left carotid vessels: The common carotid artery is normal.  Atherosclerotic changes of the carotid bulb noted. Severe  atherosclerotic disease present within the proximal left cervical ICA  over a course of 2.2 cm with near complete occlusion with  noncalcified plaque noted (series 507, image 57). The mid to distal  left cervical ICA is diffusely diminutive in caliber to moderate  degree.      Vertebral vessels: Asymmetric diminutive left vertebral artery which  may be developmental. Otherwise the visualized segments of the  cervical vertebral arteries are patent.      There is critical stenosis of the proximal left cervical ICA (up to  99%) with partial reconstitution and asymmetric diminutive caliber of  the mid to distal left cervical ICA. Vascular consult recommended.      Approximately 50% stenosis of the right cervical ICA.      No evidence for significant stenosis or large branch vessel cutoffs  of the intracranial vessels.    Assessment and plan  Carotid artery stenosis      Ultrasound and CT angio with runoff reviewed by Dr. Umaña on 2/14/2024.  Patient's left proximal ICA is almost occluded.  Right proximal ICA velocity 550/166, mid ICA to 80, distal , ratio 5.7.  Our office will reach out to patient regarding PAT plan is for right carotid endarterectomy, likely next week.  Patient to continue current medications.  Avoid strenuous activity.  Blood pressure control.  Continue smoking cessation.  Our office will reach out to the patient regarding preadmission testing and surgical date.  Patient and wife wish to forego virtual meeting with   [place]

## 2024-02-19 ENCOUNTER — TELEPHONE (OUTPATIENT)
Dept: PRIMARY CARE | Facility: CLINIC | Age: 64
End: 2024-02-19
Payer: COMMERCIAL

## 2024-02-19 DIAGNOSIS — I65.23 CAROTID STENOSIS, BILATERAL: ICD-10-CM

## 2024-02-19 RX ORDER — CLOPIDOGREL BISULFATE 75 MG/1
75 TABLET ORAL DAILY
Qty: 20 TABLET | Refills: 0 | Status: SHIPPED | OUTPATIENT
Start: 2024-02-19 | End: 2024-03-03 | Stop reason: HOSPADM

## 2024-02-19 NOTE — TELEPHONE ENCOUNTER
Please let him know I refilled it. He needs to check with his surgeon about holding Plavix prior to surgery. Thanks.

## 2024-02-19 NOTE — TELEPHONE ENCOUNTER
Patient only has one plavix left.  They are in the process of scheduling his surgery.  She thought that you were sending in a prescription for him but I don't see one in the system.

## 2024-02-20 DIAGNOSIS — I65.23 CAROTID ARTERY STENOSIS WITHOUT CEREBRAL INFARCTION, BILATERAL: Primary | ICD-10-CM

## 2024-02-28 ENCOUNTER — LAB (OUTPATIENT)
Dept: LAB | Facility: LAB | Age: 64
End: 2024-02-28
Payer: COMMERCIAL

## 2024-02-28 ENCOUNTER — OFFICE VISIT (OUTPATIENT)
Dept: CARDIOLOGY | Facility: HOSPITAL | Age: 64
End: 2024-02-28
Payer: COMMERCIAL

## 2024-02-28 ENCOUNTER — PRE-ADMISSION TESTING (OUTPATIENT)
Dept: PREADMISSION TESTING | Facility: HOSPITAL | Age: 64
End: 2024-02-28
Payer: COMMERCIAL

## 2024-02-28 VITALS
WEIGHT: 246.03 LBS | DIASTOLIC BLOOD PRESSURE: 81 MMHG | BODY MASS INDEX: 33.37 KG/M2 | HEART RATE: 56 BPM | SYSTOLIC BLOOD PRESSURE: 126 MMHG | OXYGEN SATURATION: 99 %

## 2024-02-28 VITALS
OXYGEN SATURATION: 98 % | BODY MASS INDEX: 33.59 KG/M2 | DIASTOLIC BLOOD PRESSURE: 75 MMHG | SYSTOLIC BLOOD PRESSURE: 143 MMHG | HEIGHT: 72 IN | WEIGHT: 248.02 LBS | HEART RATE: 64 BPM | TEMPERATURE: 97.9 F | RESPIRATION RATE: 16 BRPM

## 2024-02-28 DIAGNOSIS — I65.23 CAROTID ARTERY STENOSIS WITHOUT CEREBRAL INFARCTION, BILATERAL: Primary | ICD-10-CM

## 2024-02-28 DIAGNOSIS — Z01.818 PREOP TESTING: Primary | ICD-10-CM

## 2024-02-28 DIAGNOSIS — Z01.818 PREOP TESTING: ICD-10-CM

## 2024-02-28 LAB
ABO GROUP (TYPE) IN BLOOD: NORMAL
ANTIBODY SCREEN: NORMAL
APPEARANCE UR: CLEAR
APTT PPP: 26.9 SECONDS (ref 22–32.5)
BILIRUB UR STRIP.AUTO-MCNC: NEGATIVE MG/DL
COLOR UR: NORMAL
GLUCOSE UR STRIP.AUTO-MCNC: NORMAL MG/DL
INR PPP: 1 (ref 0.9–1.2)
KETONES UR STRIP.AUTO-MCNC: NEGATIVE MG/DL
LEUKOCYTE ESTERASE UR QL STRIP.AUTO: NEGATIVE
NITRITE UR QL STRIP.AUTO: NEGATIVE
PH UR STRIP.AUTO: 5 [PH]
PROT UR STRIP.AUTO-MCNC: NEGATIVE MG/DL
PROTHROMBIN TIME: 10.5 SECONDS (ref 9.3–12.7)
RBC # UR STRIP.AUTO: NEGATIVE /UL
RH FACTOR (ANTIGEN D): NORMAL
SP GR UR STRIP.AUTO: 1.02
UROBILINOGEN UR STRIP.AUTO-MCNC: NORMAL MG/DL

## 2024-02-28 PROCEDURE — 3008F BODY MASS INDEX DOCD: CPT | Performed by: NURSE PRACTITIONER

## 2024-02-28 PROCEDURE — 86900 BLOOD TYPING SEROLOGIC ABO: CPT

## 2024-02-28 PROCEDURE — 93010 ELECTROCARDIOGRAM REPORT: CPT | Performed by: INTERNAL MEDICINE

## 2024-02-28 PROCEDURE — 85730 THROMBOPLASTIN TIME PARTIAL: CPT

## 2024-02-28 PROCEDURE — 85610 PROTHROMBIN TIME: CPT

## 2024-02-28 PROCEDURE — 87081 CULTURE SCREEN ONLY: CPT | Mod: WESLAB

## 2024-02-28 PROCEDURE — 86850 RBC ANTIBODY SCREEN: CPT

## 2024-02-28 PROCEDURE — 99203 OFFICE O/P NEW LOW 30 MIN: CPT | Performed by: NURSE PRACTITIONER

## 2024-02-28 PROCEDURE — 99205 OFFICE O/P NEW HI 60 MIN: CPT | Performed by: NURSE PRACTITIONER

## 2024-02-28 PROCEDURE — 93005 ELECTROCARDIOGRAM TRACING: CPT | Performed by: NURSE PRACTITIONER

## 2024-02-28 PROCEDURE — 81003 URINALYSIS AUTO W/O SCOPE: CPT

## 2024-02-28 PROCEDURE — 99213 OFFICE O/P EST LOW 20 MIN: CPT | Mod: 25 | Performed by: NURSE PRACTITIONER

## 2024-02-28 PROCEDURE — 36415 COLL VENOUS BLD VENIPUNCTURE: CPT

## 2024-02-28 PROCEDURE — 86901 BLOOD TYPING SEROLOGIC RH(D): CPT

## 2024-02-28 RX ORDER — CHLORHEXIDINE GLUCONATE ORAL RINSE 1.2 MG/ML
SOLUTION DENTAL
Qty: 473 ML | Refills: 0 | Status: SHIPPED | OUTPATIENT
Start: 2024-02-29 | End: 2024-03-03 | Stop reason: HOSPADM

## 2024-02-28 ASSESSMENT — ENCOUNTER SYMPTOMS
CONSTITUTIONAL NEGATIVE: 1
EYES NEGATIVE: 1
GASTROINTESTINAL NEGATIVE: 1
PSYCHIATRIC NEGATIVE: 1
MUSCULOSKELETAL NEGATIVE: 1
GASTROINTESTINAL NEGATIVE: 1
CARDIOVASCULAR NEGATIVE: 1
MYALGIAS: 1
RESPIRATORY NEGATIVE: 1
PSYCHIATRIC NEGATIVE: 1
CARDIOVASCULAR NEGATIVE: 1
NEUROLOGICAL NEGATIVE: 1
ENDOCRINE NEGATIVE: 1
EYES NEGATIVE: 1
DEPRESSION: 0
HEMATOLOGIC/LYMPHATIC NEGATIVE: 1
LIGHT-HEADEDNESS: 1
RESPIRATORY NEGATIVE: 1
CONSTITUTIONAL NEGATIVE: 1
HEMATOLOGIC/LYMPHATIC NEGATIVE: 1

## 2024-02-28 ASSESSMENT — DUKE ACTIVITY SCORE INDEX (DASI)
CAN YOU WALK INDOORS, SUCH AS AROUND YOUR HOUSE: YES
CAN YOU DO HEAVY WORK AROUND THE HOUSE LIKE SCRUBBING FLOORS OR LIFTING AND MOVING HEAVY FURNITURE: YES
CAN YOU DO LIGHT WORK AROUND THE HOUSE LIKE DUSTING OR WASHING DISHES: YES
CAN YOU CLIMB A FLIGHT OF STAIRS OR WALK UP A HILL: YES
CAN YOU PARTICIPATE IN STRENOUS SPORTS LIKE SWIMMING, SINGLES TENNIS, FOOTBALL, BASKETBALL, OR SKIING: NO
CAN YOU TAKE CARE OF YOURSELF (EAT, DRESS, BATHE, OR USE TOILET): YES
CAN YOU DO YARD WORK LIKE RAKING LEAVES, WEEDING OR PUSHING A MOWER: YES
TOTAL_SCORE: 50.7
CAN YOU WALK A BLOCK OR TWO ON LEVEL GROUND: YES
CAN YOU RUN A SHORT DISTANCE: YES
DASI METS SCORE: 9
CAN YOU PARTICIPATE IN MODERATE RECREATIONAL ACTIVITIES LIKE GOLF, BOWLING, DANCING, DOUBLES TENNIS OR THROWING A BASEBALL OR FOOTBALL: YES
CAN YOU HAVE SEXUAL RELATIONS: YES
CAN YOU DO MODERATE WORK AROUND THE HOUSE LIKE VACUUMING, SWEEPING FLOORS OR CARRYING GROCERIES: YES

## 2024-02-28 ASSESSMENT — CHADS2 SCORE
HYPERTENSION: NO
AGE GREATER THAN OR EQUAL TO 75: NO
PRIOR STROKE OR TIA OR THROMBOEMBOLISM: NO
CHF: NO
DIABETES: NO
PRIOR STROKE OR TIA OR THROMBOEMBOLISM: NO
AGE GREATER THAN OR EQUAL TO 75: NO
CHF: NO
DIABETES: NO
CHADS2 SCORE: 0
CHADS2 SCORE: 0
HYPERTENSION: NO

## 2024-02-28 ASSESSMENT — PAIN SCALES - GENERAL: PAINLEVEL_OUTOF10: 0 - NO PAIN

## 2024-02-28 ASSESSMENT — PAIN - FUNCTIONAL ASSESSMENT: PAIN_FUNCTIONAL_ASSESSMENT: 0-10

## 2024-02-28 NOTE — PREPROCEDURE INSTRUCTIONS
Medication List            Accurate as of February 28, 2024  1:01 PM. Always use your most recent med list.                aspirin 81 mg chewable tablet  Chew 1 tablet (81 mg) once daily.  Medication Adjustments for Surgery: Continue until night before surgery     clopidogrel 75 mg tablet  Commonly known as: Plavix  Take 1 tablet (75 mg) by mouth once daily for 20 days.  Medication Adjustments for Surgery: Continue until night before surgery     rosuvastatin 20 mg tablet  Commonly known as: Crestor  Take 1 tablet (20 mg) by mouth once daily.  Medication Adjustments for Surgery: Continue until night before surgery                              NPO Instructions:    Do not eat any food after midnight the night before your surgery/procedure.    Additional Instructions:     Day of Surgery:  Wear  comfortable loose fitting clothing  Do not use moisturizers, creams, lotions or perfume  All jewelry and valuables should be left at home   Home Preoperative Antibacterial Shower    What is a home preoperative antibacterial shower?  This shower is a way of cleaning the skin with a germ killing solution before surgery. The solution contains chlorhexidine, commonly known as CHG. CHG is a skin cleanser with germ killing ability. Let your doctor know if you are allergic to chlorhexidine.      Why do I need to take a preoperative antibacterial shower?  Skin is not sterile. It is best to try to make your skin as free of germs as possible before surgery. Proper cleansing with a germ killing soap before surgery can lower the number of germs on your skin. This helps to reduce the risk of infection at the surgical site. Following the instructions listed below will help you prepare your skin for surgery.    How do I use the solution?      Steps: Begin using your CHG soap TODAY FEB 28  __________________________________________________.  First, wash and rinse your hair  Keep CHG away soap away from ear canals and eyes.  Rinse completely,  do not condition. Hair extensions should be removed.  Wash your face with your normal soap and rinse.  Apply the CHG solution to a clean wet washcloth. Turn the water off or move away from the water spray to avoid premature rinsing of the CHG soap as you are applying.  Firmly lather your entire body from neck down. Do not use on face.  Pay special attention to the areas(s) where your incision(s) will be located unless they are on your face.  Avoid scrubbing your skin too hard.  The important point is to have the CHG soap sit on your skin for 3 minutes.  DO NOT wash with regular soap after you have used the CHG soap solution.  Pat yourself dry with a clean, freshly laundered towel.  DO NOT apply powders, deodorants or lotions.  Dress in clean, freshly laundered night clothes.  Be sure to sleep with clean, freshly laundered sheets.  Be aware that CHG will cause stains on fabrics; if you wash them with bleach after use. Rinse your washcloth and other linens that have contact with CHG completely. Use only non-chlorine detergents to launder the items used.  The morning of surgery is the fifth day. Repeat the above steps and dress in clean comfortable clothing.   Patient Information: Oral/Dental Rinse    What is oral/dental rinse?  It is a mouthwash. It is a way of cleaning the mouth with a germ killing solution before your surgery. The solution contains chlorhexidine, commonly know as CHG.  It is used inside the mouth to kill bacteria known as Staphylococcus aureus.  Let your doctor know if you are allergic to chlorhexidine.    Why do I need to use CHG oral/dental rinse?  The CHG oral/dental rinse helps to kill bacteria in your mouth known as Staphylococcus aureus. This reduces the risk of infection at the surgical site.    Using your CHG oral/dental rinse. AT YOUR PHARMACY  STEPS: use your CHG oral/dental rinse after you brush your teeth the night before (at bedtime) and the morning of your surgery. Follow the  directions on your prescription label.  Use the cap on the container to measure 15ml (fill cap to fill line)  Swish (gargle if you can) the mouthwash in your mouth for at least 30 seconds, (do not swallow) spit out.  After you use your CHG rinse, do not rinse your mouth with water, drink or eat. Please refer to prescription label for the appropriate time to resume oral intake.    What side effects might I have using the CHG oral/dental rinse?  CHG rinse will stick to the plaque on the teeth. Brush and floss just before use. Teeth brushing will help avoid staining of plaque during use.  PAT DISCHARGE INSTRUCTIONS    Please call the Same Day Surgery (SDS) Department of the hospital where your procedure will be performed after 2:00 PM the day before your surgery. If you are scheduled on a Monday, or a Tuesday following a Monday holiday, you will need to call on the last business day prior to your surgery.    [address], [address] [phone]      Please let your surgeon know if:      You develop any open sores, shingles, burning or painful urination as these may increase your risk of an infection.   You no longer wish to have the surgery.   Any other personal circumstances change that may lead to the need to cancel or defer this surgery-such as being sick or getting admitted to any hospital within one week of your planned procedure.    Your contact details change, such as a change of address or phone number.    Starting now:     Please DO NOT drink alcohol or smoke for 24 hours before surgery. It is well known that quitting smoking can make a huge difference to your health and recovery from surgery. The longer you abstain from smoking, the better your chances of a healthy recovery. If you need help with quitting, call 0-509-QUIT-NOW to be connected to a trained counselor who will discuss the best methods to help you quit.     Before your surgery:     Please stop all supplements 7 days prior to surgery. Or as directed by your surgeon.   Please stop taking NSAID pain medicine such as Advil and Motrin 7 days before surgery.    If you develop any fever, cough, cold, rashes, cuts, scratches, scrapes, urinary symptoms or infection anywhere on your body (including teeth and gums) prior to surgery, please call your surgeon’s office as soon as possible. This may require treatment to reduce the chance of cancellation on the day of surgery.    The day before your surgery:   DIET- Do not eat or drink anything after midnight the night before surgery, including mints, candy and gum.   Get a good night’s rest.  Use the special soap for bathing if you have been instructed to use one.    Scheduled surgery times may change and you will be notified if this occurs - please check your personal voicemail for any updates.     On the morning of surgery:   Wear comfortable, loose fitting clothes which open in the front. Please do not wear moisturizers, creams, lotions, makeup or perfume.    Please bring with you to surgery:   Photo ID and insurance card   Current list of medicines and allergies   Pacemaker/ Defibrillator/Heart stent cards   CPAP machine and mask    Slings/ splints/ crutches   A copy of your complete advanced directive/DHPOA.    Please do NOT bring with you to surgery:   All jewelry and valuables should be left at home.   Prosthetic devices such as contact lenses, hearing aids, dentures, eyelash extensions, hairpins and body piercings must be removed prior to going in to the surgical suite.    After outpatient surgery:   A responsible adult MUST accompany you at the time of discharge and stay with you for 24 hours after your surgery. You may NOT drive yourself home after surgery.    Do not drive, operate machinery, make critical decisions or do activities that require co-ordination or balance until after a night’s sleep.   Do not drink alcoholic beverages for 24 hours.    Instructions for resuming your medications will be provided by your surgeon.    CALL YOUR DOCTOR AFTER SURGERY IF YOU HAVE:     Chills and/or a fever of 101° F or higher.    Redness, swelling, pus or drainage from your surgical wound or a bad smell from the wound.    Lightheadedness, fainting or confusion.    Persistent vomiting (throwing up) and are not able to eat or drink for 12 hours.    Three or more loose, watery bowel movements in 24 hours (diarrhea).   Difficulty or pain while urinating( after non-urological surgery)    Pain and swelling in your legs, especially if it is only on one side.    Difficulty breathing or are breathing faster than normal.    Any new concerning symptoms.    Who should I contact if I have questions about the CHG oral/dental rinse?  Please call [facility] at [phone] if you have any questions.      Who should I call if I have any questions regarding the use of CHG soap?  Call the [facility] at [phone] if you have any questions.

## 2024-02-28 NOTE — PATIENT INSTRUCTIONS
CALL WITH ANY QUESTIONS   NO MEDICATION CHANGES   LOW CARDIAC RISK FOR SURGERY   FOLLOW UP WHEN NEEDED

## 2024-02-28 NOTE — PROGRESS NOTES
Referred by Dr. Sawyer for New Patient Visit (Got light headed at work  dx with blocked arteries in neck)     History Of Present Illness:      Dear Dr. Umaña,     I had the pleasure of meeting Mr. Gibson today at [facility] for perioperative cardiac clearance for a carotid endarterectomy. The patient is seen in collaboration with Dr. Gabriel. Mr. Gibson is a very pleasant 63 year old gentleman with a history of HLD and former history of tobacco use. He initially presented to his primary care doctor with on going lightheadedness. Carotid ultrasound showed carotid artery stenosis bilaterally. He is scheduled for surgery at [facility], [place], operated by [facility] for a right carotid endarterectomy. He denies chest pain, shortness of breath or heart palpitations. He continues to stay very active without limitations.      Review of Systems   Constitutional: Negative.   HENT: Negative.     Eyes: Negative.    Cardiovascular: Negative.    Respiratory: Negative.     Endocrine: Negative.    Hematologic/Lymphatic: Negative.    Skin: Negative.    Musculoskeletal:  Positive for myalgias.   Gastrointestinal: Negative.    Neurological: Negative.    Psychiatric/Behavioral: Negative.          Past Medical History:  He has no past medical history on file.    Past Surgical History:  He has a past surgical history that includes Hand surgery (Left).      Social History:  He reports that he has quit smoking. His smoking use included cigarettes. He started smoking about 54 years ago. He smoked an average of 1 pack per day. He has never used smokeless tobacco. He reports that he does not currently use alcohol. He reports that he does not use drugs.    Family History:  Family History   Problem Relation Name Age of Onset    No Known Problems Mother      Leukemia Father      Diabetes Father      Cancer Father      No Known Problems Sister      No Known Problems Brother      No Known Problems Daughter      No Known Problems Son      No  Known Problems Son      No Known Problems Son      No Known Problems Son      No Known Problems Maternal Grandmother      No Known Problems Maternal Grandfather      No Known Problems Paternal Grandmother      No Known Problems Paternal Grandfather          Allergies:  Patient has no known allergies.    Outpatient Medications:  Current Outpatient Medications   Medication Instructions    aspirin 81 mg, oral, Daily    clopidogrel (PLAVIX) 75 mg, oral, Daily    rosuvastatin (CRESTOR) 20 mg, oral, Daily        Last Recorded Vitals:  Vitals:    02/28/24 1034   BP: 126/81   Pulse: 56   SpO2: 99%   Weight: 112 kg (246 lb 0.5 oz)       Physical Exam:  Physical Exam  Vitals reviewed.   HENT:      Head: Normocephalic.      Nose: Nose normal.   Eyes:      Pupils: Pupils are equal, round, and reactive to light.   Cardiovascular:      Rate and Rhythm: Normal rate and regular rhythm.   Pulmonary:      Effort: Pulmonary effort is normal.      Breath sounds: Normal breath sounds.   Abdominal:      General: Abdomen is flat.      Palpations: Abdomen is soft.   Musculoskeletal:         General: Normal range of motion.      Cervical back: Normal range of motion.   Skin:     General: Skin is warm and dry.   Neurological:      General: No focal deficit present.      Mental Status: He is alert and oriented to person, place, and time.   Psychiatric:         Mood and Affect: Mood normal.       Neck supple no JVP +bruit on right side       Last Labs:  CBC -  Lab Results   Component Value Date    WBC 7.0 01/30/2024    HGB 14.9 01/30/2024    HCT 44.5 01/30/2024    MCV 91 01/30/2024     01/30/2024       CMP -  Lab Results   Component Value Date    CALCIUM 9.1 01/30/2024    PHOS 4.4 01/30/2024    PROT 6.1 (L) 01/22/2024    ALBUMIN 4.1 01/30/2024    AST 17 01/22/2024    ALT 17 01/22/2024    ALKPHOS 76 01/22/2024    BILITOT 1.5 (H) 01/22/2024       LIPID PANEL -   Lab Results   Component Value Date    CHOL 229 (H) 01/22/2024    TRIG 144  01/22/2024    HDL 39.8 01/22/2024    CHHDL 5.8 01/22/2024    VLDL 29 01/22/2024    NHDL 189 (H) 01/22/2024       RENAL FUNCTION PANEL -   Lab Results   Component Value Date    GLUCOSE 77 01/30/2024     01/30/2024    K 4.1 01/30/2024     01/30/2024    CO2 31 01/30/2024    ANIONGAP 14 01/30/2024    BUN 14 01/30/2024    CREATININE 0.86 01/30/2024    CALCIUM 9.1 01/30/2024    PHOS 4.4 01/30/2024    ALBUMIN 4.1 01/30/2024        Lab Results   Component Value Date    HGBA1C 5.8 (H) 01/22/2024       Last Cardiology Tests:  ECG:  EKG independently reviewed from today showed sinus bradycardia heart rate 54 bpm   Echo:  Ejection Fractions:    Cath:    Stress Test:    Cardiac Imaging:    Assessment/Plan   Mr. Gibsno is a very pleasant 63 year old gentleman with a history of HLD, he is here for perioperative cardiac clearance for right carotid endarterectomy. EKG shows a sinus rhythm. He is able to walk up two flights of stairs without dyspnea. He is low cardiac risk for surgery and may proceed to the OR without further cardiac testing. Heart rate and blood pressure are well controlled today    Plan   -call with any questions   -continue Aspirin and Plavix   -continue Atorvastatin   -low cardiac risk for surgery   -follow up as needed     I appreciate the opportunity to participate in the patient's care, please call with any questions       Carito Nguyễn, APRN-CNP

## 2024-02-28 NOTE — CPM/PAT H&P
CPM/PAT Evaluation       Name: Dirk Gibson (Dirk Gibson)  /Age: 1960/63 y.o.     In-Person       Chief Complaint: lightheadedness    HPI    Pt is a 63 year old male with right carotid stenosis. Pt works in construction and he was noticing he was becoming lightheaded while working. Pt completed an US of the carotid that showed:    Right Carotid: Findings are consistent with greater than 70% stenosis of the right proximal internal carotid artery. Turbulent flow seen by color Doppler. Right external carotid artery appears patent with no evidence of stenosis. The right vertebral artery is patent with antegrade flow. There are elevated velocities in the right subclavian artery that are suggestive of disease.  Left Carotid: Findings are consistent with greater than 70% stenosis of the left proximal internal carotid artery. Turbulent flow seen by color Doppler. Left mid and distal ICA waveforms appear pre-occlusive. Additional imaging if clinically indicated. Left external carotid artery appears patent with no evidence of stenosis. The left vertebral artery is patent with antegrade flow. There are elevated velocities in the left subclavian artery that are suggestive of disease. Monophasic waveforms with low flow noted in mid and distal left ICA. Stenosis may be underestimated due to calcified shadowing plaque.    Pt was referred to a vascular surgeon and has been scheduled for right carotid artery endarterectomy. Pt denies CP, SOB, confusion, or dizziness. Pt reports he has been resting since finding out about the carotid stenosis and has not experienced lightheadedness.     Past Medical History:   Diagnosis Date    Hyperlipidemia     Lung nodule        Past Surgical History:   Procedure Laterality Date    HAND SURGERY Left      Social History     Tobacco Use    Smoking status: Former     Packs/day: 0.50     Years: 54.00     Additional pack years: 0.00     Total pack years: 27.00     Types: Cigarettes     Start  "date: 1970     Quit date:      Years since quittin.1    Smokeless tobacco: Former     Types: Chew     Quit date:    Substance Use Topics    Alcohol use: Not Currently     Social History     Substance and Sexual Activity   Drug Use Never     Patient Sexual activity questions deferred to the physician.    Family History   Problem Relation Name Age of Onset    No Known Problems Mother      Leukemia Father      Diabetes Father      Cancer Father      No Known Problems Sister      No Known Problems Brother      No Known Problems Daughter      No Known Problems Son      No Known Problems Son      No Known Problems Son      No Known Problems Son      No Known Problems Maternal Grandmother      No Known Problems Maternal Grandfather      No Known Problems Paternal Grandmother      No Known Problems Paternal Grandfather         No Known Allergies    Current Outpatient Medications   Medication Sig Dispense Refill    aspirin 81 mg chewable tablet Chew 1 tablet (81 mg) once daily. 360 tablet 0    [START ON 2024] chlorhexidine (Peridex) 0.12 % solution Use as directed. Do not start before 2024. 473 mL 0    clopidogrel (Plavix) 75 mg tablet Take 1 tablet (75 mg) by mouth once daily for 20 days. 20 tablet 0    rosuvastatin (Crestor) 20 mg tablet Take 1 tablet (20 mg) by mouth once daily. 90 tablet 1     No current facility-administered medications for this visit.      Review of Systems   Constitutional: Negative.    HENT: Negative.     Eyes: Negative.    Respiratory: Negative.     Cardiovascular: Negative.    Gastrointestinal: Negative.    Musculoskeletal: Negative.    Skin: Negative.    Neurological:  Positive for light-headedness.   Hematological: Negative.    Psychiatric/Behavioral: Negative.       /75   Pulse 64   Temp 36.6 °C (97.9 °F) (Temporal)   Resp 16   Ht 1.829 m (6' 0.01\")   Wt 113 kg (248 lb 0.3 oz)   SpO2 98%   BMI 33.63 kg/m²     Physical Exam  Vitals reviewed. "   Constitutional:       Appearance: Normal appearance.   HENT:      Head: Normocephalic and atraumatic.      Nose: Nose normal.      Mouth/Throat:      Mouth: Mucous membranes are moist.      Pharynx: Oropharynx is clear.   Eyes:      Extraocular Movements: Extraocular movements intact.      Conjunctiva/sclera: Conjunctivae normal.      Pupils: Pupils are equal, round, and reactive to light.   Neck:      Vascular: Carotid bruit (right carotid bruit) present.   Cardiovascular:      Rate and Rhythm: Normal rate and regular rhythm.      Pulses: Normal pulses.      Heart sounds: Normal heart sounds.   Pulmonary:      Effort: Pulmonary effort is normal.      Breath sounds: Normal breath sounds.   Abdominal:      General: Bowel sounds are normal.      Palpations: Abdomen is soft.   Musculoskeletal:         General: Normal range of motion.      Cervical back: Normal range of motion.   Skin:     General: Skin is warm and dry.   Neurological:      General: No focal deficit present.      Mental Status: He is alert and oriented to person, place, and time. Mental status is at baseline.   Psychiatric:         Mood and Affect: Mood normal.         Behavior: Behavior normal.         Thought Content: Thought content normal.         Judgment: Judgment normal.        PAT AIRWAY:   Airway:     Mallampati::  III    TM distance::  >3 FB    Neck ROM::  Full  normal      ASA: III  DASI: 50.7  METS: 9  CHADS: 1.9%  RCRI: 0.9%  STOP BAN    Assessment and Plan:     Carotid stenosis without cerebral infarction: endarterectomy carotid artery right.   US of carotids  CONCLUSIONS:  Right Carotid: Findings are consistent with greater than 70% stenosis of the right proximal internal carotid artery. Turbulent flow seen by color Doppler. Right external carotid artery appears patent with no evidence of stenosis. The right vertebral artery is patent with antegrade flow. There are elevated velocities in the right subclavian artery that are  suggestive of disease.  Left Carotid: Findings are consistent with greater than 70% stenosis of the left proximal internal carotid artery. Turbulent flow seen by color Doppler. Left mid and distal ICA waveforms appear pre-occlusive. Additional imaging if clinically indicated. Left external carotid artery appears patent with no evidence of stenosis. The left vertebral artery is patent with antegrade flow. There are elevated velocities in the left subclavian artery that are suggestive of disease. Monophasic waveforms with low flow noted in mid and distal left ICA. Stenosis may be underestimated due to calcified shoadowing plaque.    Clearance completed by Cardiology in [place].  EKG scanned into [employer] under heading physician order on 2/28/204   CBC and CMP drawn on 1/22/2024.     Ijeoma Whelan, JOSE ALEJANDRO-CNP

## 2024-02-29 LAB — HOLD SPECIMEN: NORMAL

## 2024-03-01 ENCOUNTER — HOSPITAL ENCOUNTER (INPATIENT)
Facility: HOSPITAL | Age: 64
LOS: 2 days | Discharge: HOME | DRG: 039 | End: 2024-03-03
Attending: SURGERY | Admitting: SURGERY
Payer: COMMERCIAL

## 2024-03-01 ENCOUNTER — ANESTHESIA EVENT (OUTPATIENT)
Dept: OPERATING ROOM | Facility: HOSPITAL | Age: 64
DRG: 039 | End: 2024-03-01
Payer: COMMERCIAL

## 2024-03-01 ENCOUNTER — ANESTHESIA (OUTPATIENT)
Dept: OPERATING ROOM | Facility: HOSPITAL | Age: 64
DRG: 039 | End: 2024-03-01
Payer: COMMERCIAL

## 2024-03-01 DIAGNOSIS — E78.2 MIXED HYPERLIPIDEMIA: ICD-10-CM

## 2024-03-01 DIAGNOSIS — I65.23 CAROTID ARTERY STENOSIS WITHOUT CEREBRAL INFARCTION, BILATERAL: Primary | ICD-10-CM

## 2024-03-01 PROBLEM — E78.5 HYPERLIPIDEMIA: Status: ACTIVE | Noted: 2024-03-01

## 2024-03-01 LAB
ABO GROUP (TYPE) IN BLOOD: NORMAL
RH FACTOR (ANTIGEN D): NORMAL
STAPHYLOCOCCUS SPEC CULT: NORMAL

## 2024-03-01 PROCEDURE — 36415 COLL VENOUS BLD VENIPUNCTURE: CPT | Performed by: SURGERY

## 2024-03-01 PROCEDURE — 2020000001 HC ICU ROOM DAILY

## 2024-03-01 PROCEDURE — A4550 SURGICAL TRAYS: HCPCS | Performed by: SURGERY

## 2024-03-01 PROCEDURE — 03CJ0ZZ EXTIRPATION OF MATTER FROM LEFT COMMON CAROTID ARTERY, OPEN APPROACH: ICD-10-PCS | Performed by: SURGERY

## 2024-03-01 PROCEDURE — 2720000007 HC OR 272 NO HCPCS: Performed by: SURGERY

## 2024-03-01 PROCEDURE — 03CK0ZZ EXTIRPATION OF MATTER FROM RIGHT INTERNAL CAROTID ARTERY, OPEN APPROACH: ICD-10-PCS | Performed by: SURGERY

## 2024-03-01 PROCEDURE — 3600000009 HC OR TIME - EACH INCREMENTAL 1 MINUTE - PROCEDURE LEVEL FOUR: Performed by: SURGERY

## 2024-03-01 PROCEDURE — 2500000005 HC RX 250 GENERAL PHARMACY W/O HCPCS: Performed by: ANESTHESIOLOGIST ASSISTANT

## 2024-03-01 PROCEDURE — 3700000002 HC GENERAL ANESTHESIA TIME - EACH INCREMENTAL 1 MINUTE: Performed by: SURGERY

## 2024-03-01 PROCEDURE — 2500000004 HC RX 250 GENERAL PHARMACY W/ HCPCS (ALT 636 FOR OP/ED): Performed by: SURGERY

## 2024-03-01 PROCEDURE — 2500000001 HC RX 250 WO HCPCS SELF ADMINISTERED DRUGS (ALT 637 FOR MEDICARE OP): Performed by: NURSE PRACTITIONER

## 2024-03-01 PROCEDURE — 7100000001 HC RECOVERY ROOM TIME - INITIAL BASE CHARGE: Performed by: SURGERY

## 2024-03-01 PROCEDURE — A35301 PR THROMBOENDARTECTMY NECK,NECK INCIS: Performed by: ANESTHESIOLOGY

## 2024-03-01 PROCEDURE — 03CM0ZZ EXTIRPATION OF MATTER FROM RIGHT EXTERNAL CAROTID ARTERY, OPEN APPROACH: ICD-10-PCS | Performed by: SURGERY

## 2024-03-01 PROCEDURE — 88304 TISSUE EXAM BY PATHOLOGIST: CPT | Performed by: PATHOLOGY

## 2024-03-01 PROCEDURE — 36620 INSERTION CATHETER ARTERY: CPT | Performed by: ANESTHESIOLOGY

## 2024-03-01 PROCEDURE — A4217 STERILE WATER/SALINE, 500 ML: HCPCS | Performed by: SURGERY

## 2024-03-01 PROCEDURE — 2500000004 HC RX 250 GENERAL PHARMACY W/ HCPCS (ALT 636 FOR OP/ED): Performed by: ANESTHESIOLOGY

## 2024-03-01 PROCEDURE — 99291 CRITICAL CARE FIRST HOUR: CPT

## 2024-03-01 PROCEDURE — 88304 TISSUE EXAM BY PATHOLOGIST: CPT | Mod: TC | Performed by: SURGERY

## 2024-03-01 PROCEDURE — 3600000004 HC OR TIME - INITIAL BASE CHARGE - PROCEDURE LEVEL FOUR: Performed by: SURGERY

## 2024-03-01 PROCEDURE — 2500000004 HC RX 250 GENERAL PHARMACY W/ HCPCS (ALT 636 FOR OP/ED): Performed by: NURSE PRACTITIONER

## 2024-03-01 PROCEDURE — 88311 DECALCIFY TISSUE: CPT | Performed by: PATHOLOGY

## 2024-03-01 PROCEDURE — 35301 RECHANNELING OF ARTERY: CPT | Performed by: SURGERY

## 2024-03-01 PROCEDURE — 3700000001 HC GENERAL ANESTHESIA TIME - INITIAL BASE CHARGE: Performed by: SURGERY

## 2024-03-01 PROCEDURE — 7100000002 HC RECOVERY ROOM TIME - EACH INCREMENTAL 1 MINUTE: Performed by: SURGERY

## 2024-03-01 PROCEDURE — 2500000004 HC RX 250 GENERAL PHARMACY W/ HCPCS (ALT 636 FOR OP/ED): Performed by: ANESTHESIOLOGIST ASSISTANT

## 2024-03-01 PROCEDURE — A35301 PR THROMBOENDARTECTMY NECK,NECK INCIS: Performed by: ANESTHESIOLOGIST ASSISTANT

## 2024-03-01 PROCEDURE — 2500000005 HC RX 250 GENERAL PHARMACY W/O HCPCS: Performed by: SURGERY

## 2024-03-01 RX ORDER — ONDANSETRON HYDROCHLORIDE 2 MG/ML
INJECTION, SOLUTION INTRAVENOUS AS NEEDED
Status: DISCONTINUED | OUTPATIENT
Start: 2024-03-01 | End: 2024-03-01

## 2024-03-01 RX ORDER — LIDOCAINE HYDROCHLORIDE 10 MG/ML
INJECTION, SOLUTION EPIDURAL; INFILTRATION; INTRACAUDAL; PERINEURAL AS NEEDED
Status: DISCONTINUED | OUTPATIENT
Start: 2024-03-01 | End: 2024-03-01

## 2024-03-01 RX ORDER — NORETHINDRONE AND ETHINYL ESTRADIOL 0.5-0.035
KIT ORAL AS NEEDED
Status: DISCONTINUED | OUTPATIENT
Start: 2024-03-01 | End: 2024-03-01

## 2024-03-01 RX ORDER — FENTANYL CITRATE 50 UG/ML
INJECTION, SOLUTION INTRAMUSCULAR; INTRAVENOUS AS NEEDED
Status: DISCONTINUED | OUTPATIENT
Start: 2024-03-01 | End: 2024-03-01

## 2024-03-01 RX ORDER — NEOSTIGMINE METHYLSULFATE 1 MG/ML
INJECTION, SOLUTION INTRAVENOUS AS NEEDED
Status: DISCONTINUED | OUTPATIENT
Start: 2024-03-01 | End: 2024-03-01

## 2024-03-01 RX ORDER — NALOXONE HYDROCHLORIDE 0.4 MG/ML
0.2 INJECTION, SOLUTION INTRAMUSCULAR; INTRAVENOUS; SUBCUTANEOUS EVERY 5 MIN PRN
Status: DISCONTINUED | OUTPATIENT
Start: 2024-03-01 | End: 2024-03-01 | Stop reason: SDUPTHER

## 2024-03-01 RX ORDER — LABETALOL HYDROCHLORIDE 5 MG/ML
5 INJECTION, SOLUTION INTRAVENOUS ONCE AS NEEDED
Status: COMPLETED | OUTPATIENT
Start: 2024-03-01 | End: 2024-03-01

## 2024-03-01 RX ORDER — PROTAMINE SULFATE 10 MG/ML
INJECTION, SOLUTION INTRAVENOUS AS NEEDED
Status: DISCONTINUED | OUTPATIENT
Start: 2024-03-01 | End: 2024-03-01

## 2024-03-01 RX ORDER — FENTANYL CITRATE 50 UG/ML
25 INJECTION, SOLUTION INTRAMUSCULAR; INTRAVENOUS EVERY 5 MIN PRN
Status: DISCONTINUED | OUTPATIENT
Start: 2024-03-01 | End: 2024-03-01 | Stop reason: HOSPADM

## 2024-03-01 RX ORDER — LABETALOL HYDROCHLORIDE 5 MG/ML
INJECTION, SOLUTION INTRAVENOUS AS NEEDED
Status: DISCONTINUED | OUTPATIENT
Start: 2024-03-01 | End: 2024-03-01

## 2024-03-01 RX ORDER — LIDOCAINE HYDROCHLORIDE AND EPINEPHRINE 10; 10 MG/ML; UG/ML
INJECTION, SOLUTION INFILTRATION; PERINEURAL AS NEEDED
Status: DISCONTINUED | OUTPATIENT
Start: 2024-03-01 | End: 2024-03-01 | Stop reason: HOSPADM

## 2024-03-01 RX ORDER — HEPARIN SODIUM 1000 [USP'U]/ML
INJECTION, SOLUTION INTRAVENOUS; SUBCUTANEOUS AS NEEDED
Status: DISCONTINUED | OUTPATIENT
Start: 2024-03-01 | End: 2024-03-01 | Stop reason: HOSPADM

## 2024-03-01 RX ORDER — SODIUM CHLORIDE, SODIUM LACTATE, POTASSIUM CHLORIDE, CALCIUM CHLORIDE 600; 310; 30; 20 MG/100ML; MG/100ML; MG/100ML; MG/100ML
100 INJECTION, SOLUTION INTRAVENOUS CONTINUOUS
Status: DISCONTINUED | OUTPATIENT
Start: 2024-03-01 | End: 2024-03-01 | Stop reason: HOSPADM

## 2024-03-01 RX ORDER — SODIUM CHLORIDE 0.9 G/100ML
IRRIGANT IRRIGATION AS NEEDED
Status: DISCONTINUED | OUTPATIENT
Start: 2024-03-01 | End: 2024-03-01 | Stop reason: HOSPADM

## 2024-03-01 RX ORDER — ROCURONIUM BROMIDE 10 MG/ML
INJECTION, SOLUTION INTRAVENOUS AS NEEDED
Status: DISCONTINUED | OUTPATIENT
Start: 2024-03-01 | End: 2024-03-01

## 2024-03-01 RX ORDER — HYDRALAZINE HYDROCHLORIDE 20 MG/ML
10 INJECTION INTRAMUSCULAR; INTRAVENOUS EVERY 4 HOURS PRN
Status: DISCONTINUED | OUTPATIENT
Start: 2024-03-01 | End: 2024-03-03 | Stop reason: HOSPADM

## 2024-03-01 RX ORDER — FENTANYL CITRATE 50 UG/ML
50 INJECTION, SOLUTION INTRAMUSCULAR; INTRAVENOUS EVERY 5 MIN PRN
Status: DISCONTINUED | OUTPATIENT
Start: 2024-03-01 | End: 2024-03-01 | Stop reason: HOSPADM

## 2024-03-01 RX ORDER — NALOXONE HYDROCHLORIDE 0.4 MG/ML
0.2 INJECTION, SOLUTION INTRAMUSCULAR; INTRAVENOUS; SUBCUTANEOUS EVERY 5 MIN PRN
Status: DISCONTINUED | OUTPATIENT
Start: 2024-03-01 | End: 2024-03-03 | Stop reason: HOSPADM

## 2024-03-01 RX ORDER — OXYCODONE HYDROCHLORIDE 5 MG/1
5 TABLET ORAL EVERY 4 HOURS PRN
Status: DISCONTINUED | OUTPATIENT
Start: 2024-03-01 | End: 2024-03-01 | Stop reason: HOSPADM

## 2024-03-01 RX ORDER — LABETALOL HYDROCHLORIDE 5 MG/ML
5 INJECTION, SOLUTION INTRAVENOUS ONCE
Status: DISCONTINUED | OUTPATIENT
Start: 2024-03-01 | End: 2024-03-01

## 2024-03-01 RX ORDER — MIDAZOLAM HYDROCHLORIDE 1 MG/ML
INJECTION, SOLUTION INTRAMUSCULAR; INTRAVENOUS AS NEEDED
Status: DISCONTINUED | OUTPATIENT
Start: 2024-03-01 | End: 2024-03-01

## 2024-03-01 RX ORDER — NITROGLYCERIN 40 MG/100ML
INJECTION INTRAVENOUS AS NEEDED
Status: DISCONTINUED | OUTPATIENT
Start: 2024-03-01 | End: 2024-03-01

## 2024-03-01 RX ORDER — ACETAMINOPHEN 325 MG/1
650 TABLET ORAL EVERY 4 HOURS PRN
Status: DISCONTINUED | OUTPATIENT
Start: 2024-03-01 | End: 2024-03-03 | Stop reason: HOSPADM

## 2024-03-01 RX ORDER — CEFAZOLIN SODIUM 2 G/100ML
2 INJECTION, SOLUTION INTRAVENOUS EVERY 8 HOURS SCHEDULED
Status: COMPLETED | OUTPATIENT
Start: 2024-03-01 | End: 2024-03-02

## 2024-03-01 RX ORDER — LIDOCAINE HYDROCHLORIDE 10 MG/ML
0.1 INJECTION INFILTRATION; PERINEURAL ONCE
Status: DISCONTINUED | OUTPATIENT
Start: 2024-03-01 | End: 2024-03-01 | Stop reason: HOSPADM

## 2024-03-01 RX ORDER — PROPOFOL 10 MG/ML
INJECTION, EMULSION INTRAVENOUS AS NEEDED
Status: DISCONTINUED | OUTPATIENT
Start: 2024-03-01 | End: 2024-03-01

## 2024-03-01 RX ORDER — GLYCOPYRROLATE 0.2 MG/ML
INJECTION INTRAMUSCULAR; INTRAVENOUS AS NEEDED
Status: DISCONTINUED | OUTPATIENT
Start: 2024-03-01 | End: 2024-03-01

## 2024-03-01 RX ORDER — CEFAZOLIN 1 G/1
INJECTION, POWDER, FOR SOLUTION INTRAVENOUS AS NEEDED
Status: DISCONTINUED | OUTPATIENT
Start: 2024-03-01 | End: 2024-03-01

## 2024-03-01 RX ORDER — ATORVASTATIN CALCIUM 40 MG/1
40 TABLET, FILM COATED ORAL NIGHTLY
Status: DISCONTINUED | OUTPATIENT
Start: 2024-03-01 | End: 2024-03-03 | Stop reason: HOSPADM

## 2024-03-01 RX ORDER — SODIUM CHLORIDE, SODIUM LACTATE, POTASSIUM CHLORIDE, CALCIUM CHLORIDE 600; 310; 30; 20 MG/100ML; MG/100ML; MG/100ML; MG/100ML
INJECTION, SOLUTION INTRAVENOUS CONTINUOUS PRN
Status: DISCONTINUED | OUTPATIENT
Start: 2024-03-01 | End: 2024-03-01

## 2024-03-01 RX ORDER — PHENYLEPHRINE HCL IN 0.9% NACL 1 MG/10 ML
SYRINGE (ML) INTRAVENOUS AS NEEDED
Status: DISCONTINUED | OUTPATIENT
Start: 2024-03-01 | End: 2024-03-01

## 2024-03-01 RX ORDER — ONDANSETRON HYDROCHLORIDE 2 MG/ML
4 INJECTION, SOLUTION INTRAVENOUS EVERY 6 HOURS PRN
Status: DISCONTINUED | OUTPATIENT
Start: 2024-03-01 | End: 2024-03-03 | Stop reason: HOSPADM

## 2024-03-01 RX ORDER — NAPROXEN SODIUM 220 MG/1
81 TABLET, FILM COATED ORAL DAILY
Status: DISCONTINUED | OUTPATIENT
Start: 2024-03-01 | End: 2024-03-03 | Stop reason: HOSPADM

## 2024-03-01 RX ORDER — SODIUM CHLORIDE, SODIUM LACTATE, POTASSIUM CHLORIDE, CALCIUM CHLORIDE 600; 310; 30; 20 MG/100ML; MG/100ML; MG/100ML; MG/100ML
50 INJECTION, SOLUTION INTRAVENOUS CONTINUOUS
Status: DISCONTINUED | OUTPATIENT
Start: 2024-03-01 | End: 2024-03-01

## 2024-03-01 RX ORDER — OXYCODONE HYDROCHLORIDE 5 MG/1
5 TABLET ORAL EVERY 6 HOURS PRN
Status: DISCONTINUED | OUTPATIENT
Start: 2024-03-01 | End: 2024-03-03 | Stop reason: HOSPADM

## 2024-03-01 RX ORDER — ONDANSETRON HYDROCHLORIDE 2 MG/ML
4 INJECTION, SOLUTION INTRAVENOUS ONCE AS NEEDED
Status: DISCONTINUED | OUTPATIENT
Start: 2024-03-01 | End: 2024-03-01 | Stop reason: HOSPADM

## 2024-03-01 RX ORDER — BUPIVACAINE HYDROCHLORIDE 5 MG/ML
INJECTION, SOLUTION PERINEURAL AS NEEDED
Status: DISCONTINUED | OUTPATIENT
Start: 2024-03-01 | End: 2024-03-01 | Stop reason: HOSPADM

## 2024-03-01 RX ADMIN — GLYCOPYRROLATE 0.2 MG: 0.2 INJECTION INTRAMUSCULAR; INTRAVENOUS at 10:16

## 2024-03-01 RX ADMIN — Medication 150 MCG: at 09:43

## 2024-03-01 RX ADMIN — Medication 150 MCG: at 09:01

## 2024-03-01 RX ADMIN — FENTANYL CITRATE 50 MCG: 50 INJECTION, SOLUTION INTRAMUSCULAR; INTRAVENOUS at 09:11

## 2024-03-01 RX ADMIN — NITROGLYCERIN 25 MCG: 10 INJECTION INTRAVENOUS at 09:14

## 2024-03-01 RX ADMIN — PROTAMINE SULFATE 30 MG: 10 INJECTION, SOLUTION INTRAVENOUS at 09:54

## 2024-03-01 RX ADMIN — ACETAMINOPHEN 650 MG: 325 TABLET ORAL at 18:10

## 2024-03-01 RX ADMIN — GLYCOPYRROLATE 0.4 MG: 0.2 INJECTION INTRAMUSCULAR; INTRAVENOUS at 10:12

## 2024-03-01 RX ADMIN — Medication 100 MCG: at 10:08

## 2024-03-01 RX ADMIN — NEOSTIGMINE METHYLSULFATE 1 MG: 1 INJECTION, SOLUTION INTRAVENOUS at 10:16

## 2024-03-01 RX ADMIN — SODIUM CHLORIDE, SODIUM LACTATE, POTASSIUM CHLORIDE, AND CALCIUM CHLORIDE 50 ML/HR: 600; 310; 30; 20 INJECTION, SOLUTION INTRAVENOUS at 07:18

## 2024-03-01 RX ADMIN — SODIUM CHLORIDE, SODIUM LACTATE, POTASSIUM CHLORIDE, AND CALCIUM CHLORIDE 50 ML/HR: 600; 310; 30; 20 INJECTION, SOLUTION INTRAVENOUS at 16:24

## 2024-03-01 RX ADMIN — PROPOFOL 200 MG: 10 INJECTION, EMULSION INTRAVENOUS at 08:07

## 2024-03-01 RX ADMIN — LABETALOL HYDROCHLORIDE 5 MG: 5 INJECTION, SOLUTION INTRAVENOUS at 10:54

## 2024-03-01 RX ADMIN — NITROGLYCERIN 50 MCG: 10 INJECTION INTRAVENOUS at 10:01

## 2024-03-01 RX ADMIN — ONDANSETRON HYDROCHLORIDE 4 MG: 2 INJECTION INTRAMUSCULAR; INTRAVENOUS at 09:59

## 2024-03-01 RX ADMIN — FENTANYL CITRATE 50 MCG: 50 INJECTION, SOLUTION INTRAMUSCULAR; INTRAVENOUS at 10:03

## 2024-03-01 RX ADMIN — HYDRALAZINE HYDROCHLORIDE 10 MG: 20 INJECTION INTRAMUSCULAR; INTRAVENOUS at 18:01

## 2024-03-01 RX ADMIN — EPHEDRINE SULFATE 10 MG: 50 INJECTION, SOLUTION INTRAVENOUS at 08:38

## 2024-03-01 RX ADMIN — NITROGLYCERIN 50 MCG: 10 INJECTION INTRAVENOUS at 09:50

## 2024-03-01 RX ADMIN — Medication 150 MCG: at 09:56

## 2024-03-01 RX ADMIN — Medication 150 MCG: at 09:32

## 2024-03-01 RX ADMIN — ROCURONIUM BROMIDE 30 MG: 10 INJECTION, SOLUTION INTRAVENOUS at 08:48

## 2024-03-01 RX ADMIN — NITROGLYCERIN 25 MCG: 10 INJECTION INTRAVENOUS at 09:21

## 2024-03-01 RX ADMIN — LABETALOL HYDROCHLORIDE 5 MG: 5 INJECTION, SOLUTION INTRAVENOUS at 10:27

## 2024-03-01 RX ADMIN — NITROGLYCERIN 25 MCG: 10 INJECTION INTRAVENOUS at 09:20

## 2024-03-01 RX ADMIN — GLYCOPYRROLATE 0.4 MG: 0.2 INJECTION INTRAMUSCULAR; INTRAVENOUS at 10:11

## 2024-03-01 RX ADMIN — FENTANYL CITRATE 25 MCG: 50 INJECTION, SOLUTION INTRAMUSCULAR; INTRAVENOUS at 10:12

## 2024-03-01 RX ADMIN — MIDAZOLAM 2 MG: 1 INJECTION INTRAMUSCULAR; INTRAVENOUS at 08:00

## 2024-03-01 RX ADMIN — NEOSTIGMINE METHYLSULFATE 2 MG: 1 INJECTION, SOLUTION INTRAVENOUS at 10:12

## 2024-03-01 RX ADMIN — FENTANYL CITRATE 25 MCG: 50 INJECTION, SOLUTION INTRAMUSCULAR; INTRAVENOUS at 10:11

## 2024-03-01 RX ADMIN — HEPARIN SODIUM 8000 UNITS: 1000 INJECTION, SOLUTION INTRAVENOUS; SUBCUTANEOUS at 09:08

## 2024-03-01 RX ADMIN — ASPIRIN 81 MG 81 MG: 81 TABLET ORAL at 17:43

## 2024-03-01 RX ADMIN — LIDOCAINE HYDROCHLORIDE 5 ML: 10 INJECTION, SOLUTION EPIDURAL; INFILTRATION; INTRACAUDAL; PERINEURAL at 08:07

## 2024-03-01 RX ADMIN — NITROGLYCERIN 25 MCG: 10 INJECTION INTRAVENOUS at 09:24

## 2024-03-01 RX ADMIN — Medication 150 MCG: at 09:27

## 2024-03-01 RX ADMIN — NITROGLYCERIN 25 MCG: 10 INJECTION INTRAVENOUS at 09:22

## 2024-03-01 RX ADMIN — ROCURONIUM BROMIDE 20 MG: 10 INJECTION, SOLUTION INTRAVENOUS at 09:30

## 2024-03-01 RX ADMIN — LABETALOL HYDROCHLORIDE 5 MG: 5 INJECTION, SOLUTION INTRAVENOUS at 10:28

## 2024-03-01 RX ADMIN — ROCURONIUM BROMIDE 50 MG: 10 INJECTION, SOLUTION INTRAVENOUS at 08:07

## 2024-03-01 RX ADMIN — SODIUM CHLORIDE, POTASSIUM CHLORIDE, SODIUM LACTATE AND CALCIUM CHLORIDE: 600; 310; 30; 20 INJECTION, SOLUTION INTRAVENOUS at 08:26

## 2024-03-01 RX ADMIN — CEFAZOLIN 2 G: 330 INJECTION, POWDER, FOR SOLUTION INTRAMUSCULAR; INTRAVENOUS at 08:10

## 2024-03-01 RX ADMIN — CEFAZOLIN SODIUM 2 G: 2 INJECTION, SOLUTION INTRAVENOUS at 18:02

## 2024-03-01 RX ADMIN — EPHEDRINE SULFATE 10 MG: 50 INJECTION, SOLUTION INTRAVENOUS at 09:18

## 2024-03-01 RX ADMIN — Medication 300 MCG: at 08:35

## 2024-03-01 RX ADMIN — Medication 100 MCG: at 09:39

## 2024-03-01 RX ADMIN — NITROGLYCERIN 50 MCG: 10 INJECTION INTRAVENOUS at 08:50

## 2024-03-01 RX ADMIN — NITROGLYCERIN 25 MCG: 10 INJECTION INTRAVENOUS at 09:52

## 2024-03-01 RX ADMIN — FENTANYL CITRATE 50 MCG: 50 INJECTION, SOLUTION INTRAMUSCULAR; INTRAVENOUS at 08:07

## 2024-03-01 RX ADMIN — SODIUM CHLORIDE, SODIUM LACTATE, POTASSIUM CHLORIDE, AND CALCIUM CHLORIDE: 600; 310; 30; 20 INJECTION, SOLUTION INTRAVENOUS at 09:14

## 2024-03-01 RX ADMIN — HYDRALAZINE HYDROCHLORIDE 10 MG: 20 INJECTION INTRAMUSCULAR; INTRAVENOUS at 11:31

## 2024-03-01 RX ADMIN — NEOSTIGMINE METHYLSULFATE 2 MG: 1 INJECTION, SOLUTION INTRAVENOUS at 10:11

## 2024-03-01 RX ADMIN — MIDAZOLAM 2 MG: 1 INJECTION INTRAMUSCULAR; INTRAVENOUS at 10:20

## 2024-03-01 RX ADMIN — NITROGLYCERIN 50 MCG: 10 INJECTION INTRAVENOUS at 10:00

## 2024-03-01 RX ADMIN — ATORVASTATIN CALCIUM 40 MG: 40 TABLET, FILM COATED ORAL at 20:54

## 2024-03-01 RX ADMIN — EPHEDRINE SULFATE 20 MG: 50 INJECTION, SOLUTION INTRAVENOUS at 08:42

## 2024-03-01 SDOH — SOCIAL STABILITY: SOCIAL INSECURITY: DOES ANYONE TRY TO KEEP YOU FROM HAVING/CONTACTING OTHER FRIENDS OR DOING THINGS OUTSIDE YOUR HOME?: NO

## 2024-03-01 SDOH — SOCIAL STABILITY: SOCIAL INSECURITY: ARE YOU OR HAVE YOU BEEN THREATENED OR ABUSED PHYSICALLY, EMOTIONALLY, OR SEXUALLY BY ANYONE?: NO

## 2024-03-01 SDOH — SOCIAL STABILITY: SOCIAL INSECURITY: HAVE YOU HAD THOUGHTS OF HARMING ANYONE ELSE?: NO

## 2024-03-01 SDOH — SOCIAL STABILITY: SOCIAL INSECURITY: WERE YOU ABLE TO COMPLETE ALL THE BEHAVIORAL HEALTH SCREENINGS?: YES

## 2024-03-01 SDOH — SOCIAL STABILITY: SOCIAL INSECURITY: DO YOU FEEL UNSAFE GOING BACK TO THE PLACE WHERE YOU ARE LIVING?: NO

## 2024-03-01 SDOH — SOCIAL STABILITY: SOCIAL INSECURITY: ARE THERE ANY APPARENT SIGNS OF INJURIES/BEHAVIORS THAT COULD BE RELATED TO ABUSE/NEGLECT?: NO

## 2024-03-01 SDOH — SOCIAL STABILITY: SOCIAL INSECURITY: HAS ANYONE EVER THREATENED TO HURT YOUR FAMILY OR YOUR PETS?: NO

## 2024-03-01 SDOH — SOCIAL STABILITY: SOCIAL INSECURITY: DO YOU FEEL ANYONE HAS EXPLOITED OR TAKEN ADVANTAGE OF YOU FINANCIALLY OR OF YOUR PERSONAL PROPERTY?: NO

## 2024-03-01 SDOH — SOCIAL STABILITY: SOCIAL INSECURITY: ABUSE: ADULT

## 2024-03-01 ASSESSMENT — LIFESTYLE VARIABLES
SKIP TO QUESTIONS 9-10: 1
HOW MANY STANDARD DRINKS CONTAINING ALCOHOL DO YOU HAVE ON A TYPICAL DAY: 1 OR 2
AUDIT-C TOTAL SCORE: 1
HOW OFTEN DO YOU HAVE A DRINK CONTAINING ALCOHOL: MONTHLY OR LESS
AUDIT-C TOTAL SCORE: 1
SUBSTANCE_ABUSE_PAST_12_MONTHS: NO
PRESCIPTION_ABUSE_PAST_12_MONTHS: NO
HOW OFTEN DO YOU HAVE 6 OR MORE DRINKS ON ONE OCCASION: NEVER

## 2024-03-01 ASSESSMENT — ACTIVITIES OF DAILY LIVING (ADL)
JUDGMENT_ADEQUATE_SAFELY_COMPLETE_DAILY_ACTIVITIES: YES
GROOMING: INDEPENDENT
DRESSING YOURSELF: INDEPENDENT
LACK_OF_TRANSPORTATION: NO
TOILETING: INDEPENDENT
FEEDING YOURSELF: INDEPENDENT
BATHING: INDEPENDENT
WALKS IN HOME: INDEPENDENT
PATIENT'S MEMORY ADEQUATE TO SAFELY COMPLETE DAILY ACTIVITIES?: YES
HEARING - RIGHT EAR: FUNCTIONAL
HEARING - LEFT EAR: FUNCTIONAL
ADEQUATE_TO_COMPLETE_ADL: YES

## 2024-03-01 ASSESSMENT — PAIN SCALES - GENERAL
PAINLEVEL_OUTOF10: 0 - NO PAIN
PAINLEVEL_OUTOF10: 1
PAINLEVEL_OUTOF10: 0 - NO PAIN

## 2024-03-01 ASSESSMENT — PAIN - FUNCTIONAL ASSESSMENT
PAIN_FUNCTIONAL_ASSESSMENT: 0-10
PAIN_FUNCTIONAL_ASSESSMENT: FLACC (FACE, LEGS, ACTIVITY, CRY, CONSOLABILITY)
PAIN_FUNCTIONAL_ASSESSMENT: 0-10

## 2024-03-01 ASSESSMENT — ENCOUNTER SYMPTOMS
EYES NEGATIVE: 1
ENDOCRINE NEGATIVE: 1
PSYCHIATRIC NEGATIVE: 1
HEMATOLOGIC/LYMPHATIC NEGATIVE: 1
RESPIRATORY NEGATIVE: 1
CARDIOVASCULAR NEGATIVE: 1
ALLERGIC/IMMUNOLOGIC NEGATIVE: 1
CONSTITUTIONAL NEGATIVE: 1
GASTROINTESTINAL NEGATIVE: 1
MUSCULOSKELETAL NEGATIVE: 1
NEUROLOGICAL NEGATIVE: 1

## 2024-03-01 ASSESSMENT — PAIN DESCRIPTION - LOCATION: LOCATION: NECK

## 2024-03-01 ASSESSMENT — COGNITIVE AND FUNCTIONAL STATUS - GENERAL
DAILY ACTIVITIY SCORE: 24
MOBILITY SCORE: 24
PATIENT BASELINE BEDBOUND: NO

## 2024-03-01 ASSESSMENT — COLUMBIA-SUICIDE SEVERITY RATING SCALE - C-SSRS
2. HAVE YOU ACTUALLY HAD ANY THOUGHTS OF KILLING YOURSELF?: NO
1. IN THE PAST MONTH, HAVE YOU WISHED YOU WERE DEAD OR WISHED YOU COULD GO TO SLEEP AND NOT WAKE UP?: NO
6. HAVE YOU EVER DONE ANYTHING, STARTED TO DO ANYTHING, OR PREPARED TO DO ANYTHING TO END YOUR LIFE?: NO
6. HAVE YOU EVER DONE ANYTHING, STARTED TO DO ANYTHING, OR PREPARED TO DO ANYTHING TO END YOUR LIFE?: NO
2. HAVE YOU ACTUALLY HAD ANY THOUGHTS OF KILLING YOURSELF?: NO
1. IN THE PAST MONTH, HAVE YOU WISHED YOU WERE DEAD OR WISHED YOU COULD GO TO SLEEP AND NOT WAKE UP?: NO

## 2024-03-01 ASSESSMENT — PAIN SCALES - WONG BAKER: WONGBAKER_NUMERICALRESPONSE: NO HURT

## 2024-03-01 ASSESSMENT — PATIENT HEALTH QUESTIONNAIRE - PHQ9
SUM OF ALL RESPONSES TO PHQ9 QUESTIONS 1 & 2: 0
1. LITTLE INTEREST OR PLEASURE IN DOING THINGS: NOT AT ALL
2. FEELING DOWN, DEPRESSED OR HOPELESS: NOT AT ALL

## 2024-03-01 ASSESSMENT — PAIN DESCRIPTION - ORIENTATION: ORIENTATION: RIGHT

## 2024-03-01 NOTE — ANESTHESIA PROCEDURE NOTES
Airway  Date/Time: 3/1/2024 8:08 AM  Urgency: elective    Airway not difficult    Staffing  Performed: MEDINA   Authorized by: Ricardo Peralta MD    Performed by: MEDINA Floyd  Patient location during procedure: OR    Indications and Patient Condition  Indications for airway management: anesthesia  Spontaneous Ventilation: absent  Sedation level: deep  Preoxygenated: yes  Patient position: sniffing  Mask difficulty assessment: 1 - vent by mask  Planned trial extubation    Final Airway Details  Final airway type: endotracheal airway      Successful airway: ETT  Cuffed: yes   Successful intubation technique: direct laryngoscopy  Facilitating devices/methods: intubating stylet  Endotracheal tube insertion site: oral  Blade: Yamil  Blade size: #3  ETT size (mm): 8.0  Cormack-Lehane Classification: grade III - view of epiglottis only  Placement verified by: chest auscultation and capnometry   Measured from: lips  ETT to lips (cm): 24  Number of attempts at approach: 2    Additional Comments  First attempt by paramedic student.

## 2024-03-01 NOTE — CARE PLAN
Problem: Psychosocial Needs  Goal: Collaborate with me, my family, and caregiver to identify my specific goals  Recent Flowsheet Documentation  Taken 3/1/2024 1649 by Mary Magallanes RN  Cultural Requests During Hospitalization: none  Spiritual Requests During Hospitalization: none     Problem: Pain  Goal: My pain/discomfort is manageable  Outcome: Progressing   The patient's goals for the shift include      The clinical goals for the shift include      Over the shift, the patient did not make progress toward the following goals. Barriers to progression include pt having surgery.  Recommendations to address these barriers include monitor labs and surgical site.

## 2024-03-01 NOTE — PERIOPERATIVE NURSING NOTE
Patient transported to [facility] with all belongings and connected to transport monitor. RN assisted to room. Patient VSS and denies any pain.

## 2024-03-01 NOTE — PERIOPERATIVE NURSING NOTE
Patient meets PACU discharge criteria. VSS. Neck incision clean dry and intact. Awaiting bed assignment.

## 2024-03-01 NOTE — OP NOTE
Endarterectomy Carotid Artery (R) Operative Note     Date: 3/1/2024  OR Location: [facility] OR    Name: Dirk Gibson, : 1960, Age: 63 y.o., MRN: [id number], Sex: male    Diagnosis  Pre-op Diagnosis     * Carotid artery stenosis without cerebral infarction, bilateral [I65.23] Post-op Diagnosis     * Carotid artery stenosis without cerebral infarction, bilateral [I65.23]     Procedures  Endarterectomy Carotid Artery  71982 - DE TEAEC W/PATCH GRF CAROTID VERTB SUBCLAV NECK INC      Surgeons      * Joe Umaña - Primary    Resident/Fellow/Other Assistant:  Surgeon(s) and Role:    Procedure Summary  Anesthesia: General  ASA: III  Anesthesia Staff: Anesthesiologist: Ricardo Peralta MD  C-AA: MEDINA Floyd  Estimated Blood Loss: 25mL  Intra-op Medications:   Administrations occurring from 0800 to 1030 on 24:   Medication Name Total Dose   heparin 1,000 unit/mL injection 9,000 Units   BUPivacaine HCl (Marcaine) 0.5 % (5 mg/mL) injection 10 mL   sodium chloride 0.9 % irrigation solution 1,000 mL   thrombin solution 5,000 Units   lidocaine-epinephrine (Xylocaine W/EPI) 1 %-1:100,000 injection 10 mL   lactated Ringer's infusion 63.33 mL              Anesthesia Record               Intraprocedure I/O Totals          Intake    LR infusion 750.00 mL    lactated Ringer's infusion 1600.00 mL    Total Intake 2350 mL          Specimen:   ID Type Source Tests Collected by Time   1 : carotid plaque Tissue [facility] EXAM Joe Umaña MD 3/1/2024 0949        Staff:   Circulator: Jill Cervantes RN; Deborah Beebe RN  Scrub Person: Carmen March  RNFA: Ricardo Melendrez         Drains and/or Catheters: * None in log *    Tourniquet Times:         Implants:     Findings: Severely calcified plaque of proximal 2cm of ICA, heavily inflamed carotid.     Indications: Dirk Gibson is an 63 y.o. male who is having surgery for Carotid artery stenosis without cerebral infarction, bilateral  [I65.23].     The patient was seen in the preoperative area. The risks, benefits, complications, treatment options, non-operative alternatives, expected recovery and outcomes were discussed with the patient. The possibilities of reaction to medication, pulmonary aspiration, injury to surrounding structures, bleeding, recurrent infection, the need for additional procedures, failure to diagnose a condition, and creating a complication requiring transfusion or operation were discussed with the patient. The patient concurred with the proposed plan, giving informed consent.  The site of surgery was properly noted/marked if necessary per policy. The patient has been actively warmed in preoperative area. Preoperative antibiotics have been ordered and given within 1 hours of incision. Venous thrombosis prophylaxis are not indicated.    Procedure Details:   Supine position, neck extended, head turned to left. Right neck prepped and draped. Oblique incision in skin lines made and subplatysmal flaps raised. Sternocleidomastoid muscle mobilized and retracted laterally. The internal jugular vein and crossing tributaries mobilized, tributaries ligated and divided as necessary, and internal jugular vein retracted laterally. The common, internal, and external carotid arteries were mobilized. The cranial nerves X and XII identified and avoided. The patient systemically heparinized at 100u/kg. The carotid arteries clamped and the internal carotid divided at its origin onto the common. Eversion endarterectomy to satisfactory distal end point achieved. An [place] shunt placed and clamped with a Jeanne tourniquet on the common. The plaque in the common mobilized and removed into the external carotid artery, achieving good end points at both ends. The adherent medial fibers removed. The artery irrigated and anastomosed to its origin with 6-0 Prolene. The shunt removed prior to completion and once completed, flow restored to external and  then internal carotid arteries. The anastomosis was hemostatic. Protamine given, 30mg. Wound irrigated and lidocaine 1% with epinephrineas local to the skin. Platysma closed with Vicryl and skin with Monocryl. Wound dressed with steristrips and gauze.       Complications:  None; patient tolerated the procedure well.    Disposition: PACU - hemodynamically stable.  Condition: stable         Additional Details:      Attending Attestation: I was present and scrubbed for the entire procedure.    [facility]  Phone Number: [phone]

## 2024-03-01 NOTE — ANESTHESIA POSTPROCEDURE EVALUATION
Patient: Dirk Gibson    Procedure Summary       Date: 03/01/24 Room / Location: [facility] OR 08 / Virtual [facility]    Anesthesia Start: 0755 Anesthesia Stop: 1046    Procedure: Endarterectomy Carotid Artery (Right: Neck) Diagnosis:       Carotid artery stenosis without cerebral infarction, bilateral      (Carotid artery stenosis without cerebral infarction, bilateral [I65.23])    Surgeons: Joe Umaña MD Responsible Provider: Ricardo Peralta MD    Anesthesia Type: general ASA Status: 3            Anesthesia Type: general    Vitals Value Taken Time   /60 03/01/24 1146   Temp 36 °C (96.8 °F) 03/01/24 1045   Pulse 66 03/01/24 1153   Resp 9 03/01/24 1153   SpO2 100 % 03/01/24 1153   Vitals shown include unvalidated device data.    Anesthesia Post Evaluation    Patient location during evaluation: PACU  Patient participation: complete - patient participated  Level of consciousness: awake  Pain management: adequate  Airway patency: patent  Cardiovascular status: acceptable  Respiratory status: acceptable  Hydration status: acceptable  Postoperative Nausea and Vomiting: none        There were no known notable events for this encounter.

## 2024-03-01 NOTE — PERIOPERATIVE NURSING NOTE
Patient ABPs 150s/60s with MAPs in the 80s. Patient cuff pressure 130s/70s with MAPs in the 90s. This RN called Dewayne Stahl NP regarding systolic ABP above provided parameters from Dr. Umaña. Hydralazine IV 10mg given at 1131. Per Dewayne Stahl NP, refer to cuff pressures for BP medication parameters.

## 2024-03-01 NOTE — ANESTHESIA PROCEDURE NOTES
Arterial Line:    Date/Time: 3/1/2024 8:26 AM    Staffing  Performed: attending   Authorized by: Ricardo Peralta MD    Performed by: Ricardo Peralta MD    An arterial line was placed. Procedure performed using ultrasound guidance.in the OR for the following indication(s): continuous blood pressure monitoring.    A 20 gauge (size), 1 and 1/4 inch (length), Angiocath (type) catheter was placed into the Right radial artery, secured by Tegademaren   Seldinger technique used.  Events:  patient tolerated procedure well with no complications.

## 2024-03-01 NOTE — ANESTHESIA PREPROCEDURE EVALUATION
Patient: Dirk Gibson    Procedure Information       Date/Time: 03/01/24 0800    Procedure: Endarterectomy Carotid Artery (Right)    Location: [facility] OR 08 / [facility]    Surgeons: Joe Umaña MD          Past Medical History:   Diagnosis Date    Hyperlipidemia     Lung nodule         Relevant Problems   Cardiovascular   (+) Carotid artery stenosis without cerebral infarction, bilateral   (+) Hyperlipidemia      Neuro/Psych   (+) Carotid artery stenosis without cerebral infarction, bilateral       Clinical information reviewed:   Tobacco  Allergies  Meds   Med Hx  Surg Hx   Fam Hx  Soc Hx        NPO Detail:  NPO/Void Status  Carbohydrate Drink Given Prior to Surgery? : N  Date of Last Liquid: 02/29/24  Time of Last Liquid: 2100  Date of Last Solid: 02/29/24  Time of Last Solid: 2000  Last Intake Type: Clear fluids  Time of Last Void: 1630         PHYSICAL EXAM    Anesthesia Plan    History of general anesthesia?: yes  History of complications of general anesthesia?: no    ASA 3     general     intravenous induction   Anesthetic plan and risks discussed with patient.    Plan discussed with CAA.

## 2024-03-01 NOTE — ANESTHESIA PROCEDURE NOTES
Peripheral IV  Date/Time: 3/1/2024 8:26 AM  Inserted by: MEDINA Floyd    Placement  Needle size: 18 G  Laterality: left  Location: hand  Local anesthetic: none  Site prep: alcohol  Technique: anatomical landmarks  Attempts: 1

## 2024-03-02 LAB
ANION GAP SERPL CALC-SCNC: 13 MMOL/L
BUN SERPL-MCNC: 16 MG/DL (ref 8–25)
CALCIUM SERPL-MCNC: 8.7 MG/DL (ref 8.5–10.4)
CHLORIDE SERPL-SCNC: 99 MMOL/L (ref 97–107)
CO2 SERPL-SCNC: 23 MMOL/L (ref 24–31)
CREAT SERPL-MCNC: 0.7 MG/DL (ref 0.4–1.6)
EGFRCR SERPLBLD CKD-EPI 2021: >90 ML/MIN/1.73M*2
ERYTHROCYTE [DISTWIDTH] IN BLOOD BY AUTOMATED COUNT: 14.1 % (ref 11.5–14.5)
GLUCOSE SERPL-MCNC: 114 MG/DL (ref 65–99)
HCT VFR BLD AUTO: 38 % (ref 41–52)
HGB BLD-MCNC: 12.9 G/DL (ref 13.5–17.5)
MAGNESIUM SERPL-MCNC: 1.8 MG/DL (ref 1.6–3.1)
MCH RBC QN AUTO: 30.9 PG (ref 26–34)
MCHC RBC AUTO-ENTMCNC: 33.9 G/DL (ref 32–36)
MCV RBC AUTO: 91 FL (ref 80–100)
NRBC BLD-RTO: 0 /100 WBCS (ref 0–0)
PLATELET # BLD AUTO: 197 X10*3/UL (ref 150–450)
POTASSIUM SERPL-SCNC: 4 MMOL/L (ref 3.4–5.1)
RBC # BLD AUTO: 4.18 X10*6/UL (ref 4.5–5.9)
SODIUM SERPL-SCNC: 135 MMOL/L (ref 133–145)
WBC # BLD AUTO: 10 X10*3/UL (ref 4.4–11.3)

## 2024-03-02 PROCEDURE — 97116 GAIT TRAINING THERAPY: CPT | Mod: GP

## 2024-03-02 PROCEDURE — 80048 BASIC METABOLIC PNL TOTAL CA: CPT | Performed by: NURSE PRACTITIONER

## 2024-03-02 PROCEDURE — 2500000004 HC RX 250 GENERAL PHARMACY W/ HCPCS (ALT 636 FOR OP/ED): Performed by: NURSE PRACTITIONER

## 2024-03-02 PROCEDURE — 2500000001 HC RX 250 WO HCPCS SELF ADMINISTERED DRUGS (ALT 637 FOR MEDICARE OP): Performed by: NURSE PRACTITIONER

## 2024-03-02 PROCEDURE — 97530 THERAPEUTIC ACTIVITIES: CPT | Mod: GO

## 2024-03-02 PROCEDURE — 85027 COMPLETE CBC AUTOMATED: CPT | Performed by: NURSE PRACTITIONER

## 2024-03-02 PROCEDURE — 37799 UNLISTED PX VASCULAR SURGERY: CPT | Performed by: NURSE PRACTITIONER

## 2024-03-02 PROCEDURE — 2500000004 HC RX 250 GENERAL PHARMACY W/ HCPCS (ALT 636 FOR OP/ED)

## 2024-03-02 PROCEDURE — 97165 OT EVAL LOW COMPLEX 30 MIN: CPT | Mod: GO

## 2024-03-02 PROCEDURE — 99291 CRITICAL CARE FIRST HOUR: CPT

## 2024-03-02 PROCEDURE — 83735 ASSAY OF MAGNESIUM: CPT

## 2024-03-02 PROCEDURE — 97162 PT EVAL MOD COMPLEX 30 MIN: CPT | Mod: GP

## 2024-03-02 PROCEDURE — 99024 POSTOP FOLLOW-UP VISIT: CPT | Performed by: NURSE PRACTITIONER

## 2024-03-02 PROCEDURE — 2060000001 HC INTERMEDIATE ICU ROOM DAILY

## 2024-03-02 RX ORDER — LABETALOL HYDROCHLORIDE 5 MG/ML
20 INJECTION, SOLUTION INTRAVENOUS ONCE
Status: DISCONTINUED | OUTPATIENT
Start: 2024-03-02 | End: 2024-03-03 | Stop reason: HOSPADM

## 2024-03-02 RX ORDER — LABETALOL HYDROCHLORIDE 5 MG/ML
20 INJECTION, SOLUTION INTRAVENOUS EVERY 6 HOURS PRN
Status: DISCONTINUED | OUTPATIENT
Start: 2024-03-02 | End: 2024-03-03 | Stop reason: HOSPADM

## 2024-03-02 RX ORDER — MAGNESIUM SULFATE HEPTAHYDRATE 40 MG/ML
2 INJECTION, SOLUTION INTRAVENOUS ONCE
Status: COMPLETED | OUTPATIENT
Start: 2024-03-02 | End: 2024-03-02

## 2024-03-02 RX ADMIN — HYDRALAZINE HYDROCHLORIDE 10 MG: 20 INJECTION INTRAMUSCULAR; INTRAVENOUS at 01:10

## 2024-03-02 RX ADMIN — ATORVASTATIN CALCIUM 40 MG: 40 TABLET, FILM COATED ORAL at 20:08

## 2024-03-02 RX ADMIN — CEFAZOLIN SODIUM 2 G: 2 INJECTION, SOLUTION INTRAVENOUS at 05:00

## 2024-03-02 RX ADMIN — ACETAMINOPHEN 650 MG: 325 TABLET ORAL at 01:59

## 2024-03-02 RX ADMIN — ACETAMINOPHEN 650 MG: 325 TABLET ORAL at 20:11

## 2024-03-02 RX ADMIN — LABETALOL HYDROCHLORIDE 20 MG: 5 INJECTION INTRAVENOUS at 04:37

## 2024-03-02 RX ADMIN — ASPIRIN 81 MG 81 MG: 81 TABLET ORAL at 09:49

## 2024-03-02 RX ADMIN — MAGNESIUM SULFATE HEPTAHYDRATE 2 G: 40 INJECTION, SOLUTION INTRAVENOUS at 05:47

## 2024-03-02 ASSESSMENT — COGNITIVE AND FUNCTIONAL STATUS - GENERAL
CLIMB 3 TO 5 STEPS WITH RAILING: A LOT
DAILY ACTIVITIY SCORE: 19
DRESSING REGULAR UPPER BODY CLOTHING: A LITTLE
WALKING IN HOSPITAL ROOM: A LITTLE
MOBILITY SCORE: 18
HELP NEEDED FOR BATHING: A LITTLE
DAILY ACTIVITIY SCORE: 24
STANDING UP FROM CHAIR USING ARMS: A LITTLE
MOBILITY SCORE: 24
TURNING FROM BACK TO SIDE WHILE IN FLAT BAD: A LITTLE
TOILETING: A LITTLE
PERSONAL GROOMING: A LITTLE
DRESSING REGULAR LOWER BODY CLOTHING: A LITTLE
MOVING TO AND FROM BED TO CHAIR: A LITTLE

## 2024-03-02 ASSESSMENT — PAIN DESCRIPTION - LOCATION: LOCATION: HEAD

## 2024-03-02 ASSESSMENT — PAIN SCALES - GENERAL
PAINLEVEL_OUTOF10: 0 - NO PAIN
PAINLEVEL_OUTOF10: 2
PAINLEVEL_OUTOF10: 1
PAINLEVEL_OUTOF10: 1
PAINLEVEL_OUTOF10: 0 - NO PAIN
PAINLEVEL_OUTOF10: 0 - NO PAIN
PAINLEVEL_OUTOF10: 4
PAINLEVEL_OUTOF10: 0 - NO PAIN
PAINLEVEL_OUTOF10: 1

## 2024-03-02 ASSESSMENT — ACTIVITIES OF DAILY LIVING (ADL)
BATHING_ASSISTANCE: STAND BY
ADL_ASSISTANCE: INDEPENDENT
ADL_ASSISTANCE: INDEPENDENT

## 2024-03-02 ASSESSMENT — PAIN SCALES - WONG BAKER
WONGBAKER_NUMERICALRESPONSE: HURTS LITTLE BIT
WONGBAKER_NUMERICALRESPONSE: HURTS LITTLE BIT
WONGBAKER_NUMERICALRESPONSE: NO HURT

## 2024-03-02 ASSESSMENT — PAIN DESCRIPTION - DESCRIPTORS
DESCRIPTORS: ACHING

## 2024-03-02 ASSESSMENT — PAIN - FUNCTIONAL ASSESSMENT
PAIN_FUNCTIONAL_ASSESSMENT: 0-10
PAIN_FUNCTIONAL_ASSESSMENT: 0-10
PAIN_FUNCTIONAL_ASSESSMENT: FLACC (FACE, LEGS, ACTIVITY, CRY, CONSOLABILITY)
PAIN_FUNCTIONAL_ASSESSMENT: 0-10
PAIN_FUNCTIONAL_ASSESSMENT: FLACC (FACE, LEGS, ACTIVITY, CRY, CONSOLABILITY)

## 2024-03-02 ASSESSMENT — PAIN DESCRIPTION - ORIENTATION: ORIENTATION: OTHER (COMMENT)

## 2024-03-02 NOTE — H&P
[facility] Critical Care Medicine History & Physical      Date:  3/1/2024  Patient:  Dirk Gibson  YOB: 1960  MRN:  [id number]   Admit Date:  3/1/2024  No chief complaint on file.       History of Present Illness:  Dirk Gibson is a 63 year old male with history of smoker. Patient reports symptoms started over the summer while working that consisted of lightheadedness with exertion. With reports that during these episodes patient also became confused and had altered speech. Patient also reports one event of leg numbness lasting for multiple hours.     Patient now s/p Carotid endarterectomy with Dr. Umaña admitted to the ICU for frequent neuro assessments.    ROS:  Review of Systems   Constitutional: Negative.    HENT: Negative.     Eyes: Negative.    Respiratory: Negative.     Cardiovascular: Negative.    Gastrointestinal: Negative.    Endocrine: Negative.    Musculoskeletal: Negative.    Skin: Negative.    Allergic/Immunologic: Negative.    Neurological: Negative.    Hematological: Negative.    Psychiatric/Behavioral: Negative.         Medical History:  Past Medical History:   Diagnosis Date   • Hyperlipidemia    • Lung nodule      Past Surgical History:   Procedure Laterality Date   • HAND SURGERY Left      Medications Prior to Admission   Medication Sig Dispense Refill Last Dose   • aspirin 81 mg chewable tablet Chew 1 tablet (81 mg) once daily. 360 tablet 0 2/29/2024 at 2000   • chlorhexidine (Peridex) 0.12 % solution Use as directed. Do not start before February 29, 2024. 473 mL 0 3/1/2024 at 0400   • clopidogrel (Plavix) 75 mg tablet Take 1 tablet (75 mg) by mouth once daily for 20 days. 20 tablet 0 2/29/2024 at 2000   • rosuvastatin (Crestor) 20 mg tablet Take 1 tablet (20 mg) by mouth once daily. 90 tablet 1 2/29/2024 at 2000     Patient has no known allergies.  Social History     Tobacco Use   • Smoking status: Former     Packs/day: 0.50     Years: 54.00     Additional pack years: 0.00      Total pack years: 27.00     Types: Cigarettes     Start date: 1970     Quit date:      Years since quittin.1   • Smokeless tobacco: Former     Types: Chew     Quit date:    Vaping Use   • Vaping Use: Never used   Substance Use Topics   • Alcohol use: Not Currently   • Drug use: Never     Family History   Problem Relation Name Age of Onset   • No Known Problems Mother     • Leukemia Father     • Diabetes Father     • Cancer Father     • No Known Problems Sister     • No Known Problems Brother     • No Known Problems Daughter     • No Known Problems Son     • No Known Problems Son     • No Known Problems Son     • No Known Problems Son     • No Known Problems Maternal Grandmother     • No Known Problems Maternal Grandfather     • No Known Problems Paternal Grandmother     • No Known Problems Paternal Grandfather         Hospital Medications:  lactated Ringer's, 50 mL/hr, Last Rate: 50 mL/hr (24)          Current Facility-Administered Medications:   •  acetaminophen (Tylenol) tablet 650 mg, 650 mg, oral, q4h PRN, JOSE ALEJANDRO Bray-CNP, 650 mg at 24  •  aspirin chewable tablet 81 mg, 81 mg, oral, Daily, JOSE ALEJANDRO Bray-CNP, 81 mg at 24 1743  •  atorvastatin (Lipitor) tablet 40 mg, 40 mg, oral, Nightly, JOSE ALEJANDRO Bray-CNP, 40 mg at 24  •  ceFAZolin in dextrose (iso-os) (Ancef) IVPB 2 g, 2 g, intravenous, q8h ANGUS, JOSE ALEJANDRO Bray-CNP, Stopped at 24 183  •  hydrALAZINE (Apresoline) injection 10 mg, 10 mg, intravenous, q4h PRN, JOSE ALEJANDRO Bray-CNP, 10 mg at 24 180  •  HYDROmorphone (Dilaudid) injection 0.5 mg, 0.5 mg, intravenous, q4h PRN, Mary Quevedo APRN-CNP  •  lactated Ringer's infusion, 50 mL/hr, intravenous, Continuous, JOSE ALEJANDRO Bray-CNP, Last Rate: 50 mL/hr at 24, 50 mL/hr at 24  •  naloxone (Narcan) injection 0.2 mg, 0.2 mg, intravenous, q5 min PRN, Mary Quevedo,  APRN-CNP  •  ondansetron (Zofran) injection 4 mg, 4 mg, intravenous, q6h PRN, YUSUF Bray  •  oxyCODONE (Roxicodone) immediate release tablet 5 mg, 5 mg, oral, q6h PRN, YUSUF Bray  •  oxygen (O2) therapy, , inhalation, Continuous PRN - O2/gases, YUSUF Bray    Physical Exam:  Heart Rate:  [66-85]   Temp:  [36 °C (96.8 °F)-36.9 °C (98.4 °F)]   Resp:  [10-18]   BP: (120-163)/(61-82)   Height:  [182.9 cm (6')]   Weight:  [115 kg (252 lb 10.4 oz)]   SpO2:  [95 %-100 %]          Physical Exam  HENT:      Mouth/Throat:      Mouth: Mucous membranes are dry.      Pharynx: Oropharynx is clear.   Eyes:      Pupils: Pupils are equal, round, and reactive to light.   Cardiovascular:      Rate and Rhythm: Normal rate and regular rhythm.      Pulses: Normal pulses.   Pulmonary:      Effort: Pulmonary effort is normal.   Abdominal:      General: Abdomen is flat.      Palpations: Abdomen is soft.   Musculoskeletal:         General: Normal range of motion.      Cervical back: Normal range of motion.   Skin:     General: Skin is warm and dry.      Capillary Refill: Capillary refill takes less than 2 seconds.      Findings: Rash present. Rash is macular. Rash is not crusting, nodular, papular, purpuric, pustular, scaling or vesicular.      Comments: Incision to Right neck with dry gauze bandage, slight swelling to area, no tenderness or drainage  Rash to right upper chest and neck around, around incision/dressing, patient reports no tenderness, swelling, itching, burning or irritation   Neurological:      General: No focal deficit present.      Mental Status: He is alert and oriented to person, place, and time.   Psychiatric:         Mood and Affect: Mood normal.       Objective:    Intake/Output Summary (Last 24 hours) at 3/1/2024 2206  Last data filed at 3/1/2024 2000  Gross per 24 hour   Intake 2452.49 ml   Output 750 ml   Net 1702.49 ml       Results for orders placed or performed  during the hospital encounter of 03/01/24 (from the past 24 hour(s))   Verify ABO/Rh Group Test (VERAB)   Result Value Ref Range    ABO TYPE O     Rh TYPE POS        Recent Imaging  No orders to display       No echocardiogram results found for the past 14 days    Assessment/Plan   Principal Problem:    Carotid artery stenosis without cerebral infarction, bilateral  Active Problems:    Hyperlipidemia      Pt is a 63 year old male with history as a cigarette smoker. Admitted to ICU s/p carotid endarterectomy.    Plan:  Neuro/Psych/Pain Ctrl/Sedation:  -CAM ICU score q-shift  -Sleep/wake cycle hygiene  -Delirium precaution   -Q4 neuro exams  -Pain control:Tylenol Oxycodone hydromorphone Prn    Respiratory/ENT:  Hx Smoker  -On RA  -Continuous pulse oximetry  -O2 PRN to maintain pulse ox >92%    Cardiovascular:  #S/p Carotid Endarterectomy  -Continuous cardiac monitoring   -Hydralazine PRN to maintain systolic <140  -ASA Lipitor    Renal/Volume Status (Intra & Extravascular):  - Maintain urine output 0.5-1.0cc/kg/hr  - Monitor I/O's  - Replete electrolytes to maintain K >4.0 and Mg >2.0  - Daily BMP, Mg     GI:  -Clear liquid diet, will check with Dr shin tomorrow about advancing  -Zofran PRN for nausea    Infectious Disease:  -No current Infectious s/s  -Post op Cefazolin for surgical prophylaxis for 2 doses  -Q4 temp assessment  -Daily CBC    Heme/Onc:  -daily CBC    Endocrine:  -No history of DM  -hemoglobin A1C 5.8 (1/24)  -Will follow up BG with daily bmp    OBGYN/Ortho:  -PT/OT orders placed  -Ambulation as tolerated     Ethics/Code Status:  Full    Lab Draw Frequency:  Daily CBC Mag BMP    :  DVT Prophylaxis: None  GI Prophylaxis: None  Bowel Regimen: Colace prn  Diet: Clear liquid  CVC: None  [place]: R radial 3/1  [place]: None  Restraints: None  Dispo: Will remain in Icu overnight, can downgrade tomorrow    Critical Care Time:  35 minutes spent in preparing to see patient (I.e. review of medical  records), evaluation of diagnostics (I.e. labs, imaging, etc.), documentation, discussing plan of care with patient/ family/ caregiver, and/ or coordination of care with multidisciplinary team. Time does not include completion of procedure time.      Plan discussed with Dr Ronal Jackson, APRN-CNP

## 2024-03-02 NOTE — PROGRESS NOTES
Dirk Gibson is a 63 y.o. male on day 1 of admission presenting with Carotid artery stenosis without cerebral infarction, bilateral.    Subjective   Patient is postoperative day 1 from a right carotid endarterectomy.  Patient states that he had a headache earlier in the day but since he has eaten food and had a couple coffee the headache has resolved.  Was noted to have hypertension postoperatively, controlled with as needed antihypertensives. Denies any neurologic deficits.    Objective     Physical Exam    General: Pt is alert and oriented x 3. Pleasant, conversive  HEENT: Head is atraumatic, normocephalic.  Trickle incision with Steri-Strips in place.  Mild hematoma right neck.  Soft and not taut.  Cardiac: Normal S1-S2.  Regular rate and rhythm.  No murmurs.  Respiratory: Lungs clear to auscultation.  No adventitious sounds.  Abdomen: Soft, nondistended, nontender.  Bowel sounds x4 quadrants.  Pulse exam: Palpable brachial and radial pulses bilaterall.   Extremities are warm and well-perfused.  Extremities: No significant edema noted.  Extremities are warm to the touch and normal in color.  No open wounds or sores.  Neuro: Moves all extremities spontaneously.  No focal deficits.  Psych: Appropriate affect.  Answers questions appropriately.    Last Recorded Vitals  Blood pressure 128/71, pulse 69, temperature 36.6 °C (97.9 °F), temperature source Temporal, resp. rate 12, height 1.829 m (6'), weight 115 kg (253 lb 12 oz), SpO2 95 %.  Intake/Output last 3 Shifts:  I/O last 3 completed shifts:  In: 4213.3 (36.8 mL/kg) [P.O.:1550; I.V.:2663.3 (23.2 mL/kg)]  Out: 1970 (17.2 mL/kg) [Urine:1970 (0.5 mL/kg/hr)]  Dosing Weight: 114.6 kg     Relevant Results    Scheduled medications  aspirin, 81 mg, oral, Daily  atorvastatin, 40 mg, oral, Nightly  labetaloL, 20 mg, intravenous, Once      Continuous medications     PRN medications  PRN medications: acetaminophen, hydrALAZINE, HYDROmorphone, labetaloL, naloxone, ondansetron,  oxyCODONE, oxygen     Results for orders placed or performed during the hospital encounter of 03/01/24 (from the past 24 hour(s))   CBC   Result Value Ref Range    WBC 10.0 4.4 - 11.3 x10*3/uL    nRBC 0.0 0.0 - 0.0 /100 WBCs    RBC 4.18 (L) 4.50 - 5.90 x10*6/uL    Hemoglobin 12.9 (L) 13.5 - 17.5 g/dL    Hematocrit 38.0 (L) 41.0 - 52.0 %    MCV 91 80 - 100 fL    MCH 30.9 26.0 - 34.0 pg    MCHC 33.9 32.0 - 36.0 g/dL    RDW 14.1 11.5 - 14.5 %    Platelets 197 150 - 450 x10*3/uL   Basic Metabolic Panel   Result Value Ref Range    Glucose 114 (H) 65 - 99 mg/dL    Sodium 135 133 - 145 mmol/L    Potassium 4.0 3.4 - 5.1 mmol/L    Chloride 99 97 - 107 mmol/L    Bicarbonate 23 (L) 24 - 31 mmol/L    Urea Nitrogen 16 8 - 25 mg/dL    Creatinine 0.70 0.40 - 1.60 mg/dL    eGFR >90 >60 mL/min/1.73m*2    Calcium 8.7 8.5 - 10.4 mg/dL    Anion Gap 13 <=19 mmol/L   Magnesium   Result Value Ref Range    Magnesium 1.80 1.60 - 3.10 mg/dL                             Assessment/Plan   Carotid stenosis  Hypertension  Headache    Discussed in detail with Dr. Umaña    Carotid stenosis-patient is postoperative day 1 from a right carotid endarterectomy.  Doing well postoperatively.  He is eating and drinking.  He has been up and walking the halls with physical therapy.  Okay to transfer to [facility].  Hypertension-goal systolic blood pressure 100-140.  For the most part, patient has been within this range, although has required as needed antihypertensives  Headache-resolved after eating food and drinking a cup of coffee.  Patient notes that he drinks 3-4 pots of coffee per day, could be component of caffeine withdrawal.  Will monitor closely.  Stable neurostatus.       I spent 35 minutes in the professional and overall care of this patient.      Daniel Stahl, APRN-CNP

## 2024-03-02 NOTE — PROGRESS NOTES
Physical Therapy    Physical Therapy Evaluation & Treatment    Patient Name: Dirk Gibson  MRN: [id number]  Today's Date: 3/2/2024   Time Calculation  Start Time: 0755  Stop Time: 0816  Time Calculation (min): 21 min    Assessment/Plan   PT Assessment  PT Assessment Results: Decreased endurance, Impaired balance, Decreased mobility, Decreased coordination, Decreased safety awareness, Impaired vision, Pain  Rehab Prognosis: Excellent  Evaluation/Treatment Tolerance: Patient tolerated treatment well  Medical Staff Made Aware: Yes  Strengths: Ability to acquire knowledge  End of Session Communication: Bedside nurse  Assessment Comment: The patient is a 63 y.o. male admitted to the hospital s/p Rt CEA. The patient currently completes transfers and ambulation with CGA and use of RW. The patient has experienced a functional decline and would continue to benefit from skilled therapy services to address residual functional deficits.  End of Session Patient Position: Up in chair, Alarm off, caregiver present   IP OR SWING BED PT PLAN  Inpatient or Swing Bed: Inpatient  PT Plan  Treatment/Interventions: Bed mobility, Transfer training, Gait training, Stair training, Balance training, Neuromuscular re-education, Strengthening, Endurance training, Range of motion, Therapeutic exercise, Therapeutic activity, Home exercise program  PT Plan: Skilled PT  PT Frequency: 6 times per week  PT Discharge Recommendations: Low intensity level of continued care  PT Recommended Transfer Status: Contact guard  PT - OK to Discharge: Yes      Subjective     General Visit Information:  General  Reason for Referral: mobility impairment; s/p Rt CEA  Referred By: YUSUF Linn  Past Medical History Relevant to Rehab: hyperlipidemia, lung nodule, L hand surgery  Family/Caregiver Present: Yes  Caregiver Feedback: spouse  Co-Treatment: OT  Co-Treatment Reason: Medical complexity s/p R CEA in ICU  Prior to Session Communication: Bedside  nurse  Patient Position Received: Bed, 3 rail up, Alarm off, not on at start of session  Preferred Learning Style: verbal, visual  General Comment: Pt is a 62 yo male admitted to the hospital with Carotid artery stenosis without cerebral infarction and is s/p R CEA by Dr. Umaña on 3/1/2024.  Home Living:  Home Living  Type of Home: House  Lives With: Spouse  Home Adaptive Equipment: None  Home Layout: Two level, Stairs to alternate level without rails  Alternate Level Stairs-Rails: None  Alternate Level Stairs-Number of Steps: 2  Home Access: Level entry  Bathroom Shower/Tub: Tub/shower unit  Bathroom Toilet: Standard  Bathroom Equipment: None  Prior Level of Function:  Prior Function Per Pt/Caregiver Report  Level of [place]: Independent with ADLs and functional transfers, Independent with homemaking with ambulation  ADL Assistance: Independent  Homemaking Assistance: Independent  Ambulatory Assistance: Independent  Vocational: Full time employment (Construction)  Hand Dominance: Right  Prior Function Comments: Pt very active and driving PTA.  Precautions:  Precautions  Hearing/Visual Limitations: glasses  Medical Precautions: Fall precautions  Vital Signs:  Vital Signs  Heart Rate: 65  Heart Rate Source: Monitor  Resp: 13  SpO2: 94 %  BP: 128/51  MAP (mmHg): 73  BP Location: Left arm  BP Method: Automatic  Patient Position: Lying    Objective   Pain:  Pain Assessment  Pain Assessment: 0-10  Pain Score: 0 - No pain  Cognition:  Cognition  Overall Cognitive Status: Within Functional Limits  Orientation Level: Oriented X4    General Assessments:  General Observation  General Observation: pleasant and motivated for mobility    Activity Tolerance  Rate of Perceived Exertion (RPE): 2/10    Sensation  Light Touch: No apparent deficits  Sharp/Dull: No apparent deficits    Strength  Strength Comments: BLE grossly 4/5  Coordination  Coordination Comment: Mildly increased time for mobility    Postural Control  Posture  Comment: rounded shoulders    Static Sitting Balance  Static Sitting-Balance Support: Feet supported  Static Sitting-Level of Assistance: Close supervision  Static Sitting-Comment/Number of Minutes: EOB for approx. 5 min    Static Standing Balance  Static Standing-Balance Support: Bilateral upper extremity supported (RW)  Static Standing-Level of Assistance: Contact guard  Static Standing-Comment/Number of Minutes: approx. 2 min  Dynamic Standing Balance  Dynamic Standing-Balance Support: Bilateral upper extremity supported (RW)  Dynamic Standing-Comments: CGA for ambulation with RW; narrow ARLYN with mildly decreased iman  Functional Assessments:  Bed Mobility  Bed Mobility: Yes  Bed Mobility 1  Bed Mobility 1: Supine to sitting  Level of Assistance 1: Close supervision  Bed Mobility Comments 1: Use of bed rails with HOB to 35 deg.    Transfers  Transfer: Yes  Transfer 1  Transfer From 1: Bed to, Stand to  Transfer to 1: Stand, Chair with arms  Technique 1: Sit to stand, Stand to sit  Transfer Device 1: Walker  Transfer Level of Assistance 1: Close supervision  Trials/Comments 1: VCs for safe hand placement    Ambulation/Gait Training  Ambulation/Gait Training Performed: Yes  Ambulation/Gait Training 1  Surface 1: Level tile  Device 1: Rolling walker  Assistance 1: Contact guard  Quality of Gait 1: Narrow base of support, Decreased step length  Comments/Distance (ft) 1: 50ftx2 with CGA and use of RW; decreased iman with VCs provided for safe use of AD.  Extremity/Trunk Assessments:  RLE   RLE : Within Functional Limits  LLE   LLE : Within Functional Limits  Treatments:  Bed Mobility  Bed Mobility: Yes  Bed Mobility 1  Bed Mobility 1: Supine to sitting  Level of Assistance 1: Close supervision  Bed Mobility Comments 1: Use of bed rails with HOB to 35 deg.    Ambulation/Gait Training  Ambulation/Gait Training Performed: Yes  Ambulation/Gait Training 1  Surface 1: Level tile  Device 1: Rolling walker  Assistance  1: Contact guard  Quality of Gait 1: Narrow base of support, Decreased step length  Comments/Distance (ft) 1: 50ftx2 with CGA and use of RW; decreased iman with VCs provided for safe use of AD.  Transfers  Transfer: Yes  Transfer 1  Transfer From 1: Bed to, Stand to  Transfer to 1: Stand, Chair with arms  Technique 1: Sit to stand, Stand to sit  Transfer Device 1: Walker  Transfer Level of Assistance 1: Close supervision  Trials/Comments 1: VCs for safe hand placement  Outcome Measures:  [facility] Basic Mobility  Turning from your back to your side while in a flat bed without using bedrails: None  Moving from lying on your back to sitting on the side of a flat bed without using bedrails: A little  Moving to and from bed to chair (including a wheelchair): A little  Standing up from a chair using your arms (e.g. wheelchair or bedside chair): A little  To walk in hospital room: A little  Climbing 3-5 steps with railing: A lot  Basic Mobility - Total Score: 18    Encounter Problems       Encounter Problems (Active)       PT Problem       Strength (Progressing)       Start:  03/02/24    Expected End:  04/02/24       The patient will demonstrate an overall strength of 4/5 in BLE to assist with completion of functional mobility.           Functional Mobility (Progressing)       Start:  03/02/24    Expected End:  04/02/24       The patient will complete functional mobility (bed mobility, transfers, etc.) at a mod indep level with LRAD by DC.           Ambulation (Progressing)       Start:  03/02/24    Expected End:  04/02/24       The patient will be able to ambulate at an indep level for [id number] without an assistive device.          Balance (Progressing)       Start:  03/02/24    Expected End:  04/02/24       The patient will demonstrate good/+ dynamic standing balance during activity with LRAD.          Stairs (Progressing)       Start:  03/02/24    Expected End:  04/02/24       The patient will be able to ascend/descend 2  steps with no rails at an indep level by DC.             Pain - Adult              Education Documentation  Precautions, taught by Latricia Farley PT at 3/2/2024  9:29 AM.  Learner: Patient  Readiness: Acceptance  Method: Explanation  Response: Verbalizes Understanding    Mobility Training, taught by Latricia Farley PT at 3/2/2024  9:29 AM.  Learner: Patient  Readiness: Acceptance  Method: Explanation  Response: Verbalizes Understanding    Education Comments  No comments found.

## 2024-03-02 NOTE — CARE PLAN
The patient's goals for the shift include get some sleep    The clinical goals for the shift include Remain hemodynamically stable    Over the shift, the patient did not make progress toward the following goals. Barriers to progression include . Recommendations to address these barriers include .

## 2024-03-02 NOTE — PROGRESS NOTES
Occupational Therapy    Evaluation/Treatment    Patient Name: Dirk Gibson  MRN: [id number]  : 1960  Today's Date: 24  Time Calculation  Start Time: 0750  Stop Time: 0815  Time Calculation (min): 25 min       Assessment:  OT Assessment: Pt presents on eval with generalized weakness after surgery and mildly impaired standing balance affecting his self-care and functional transfers/mobility. Pt will benefit from continued skilled OT to address these deficits.  Prognosis: Excellent  Evaluation/Treatment Tolerance: Patient tolerated treatment well  End of Session Communication: Bedside nurse  End of Session Patient Position: Up in chair, Alarm off, caregiver present (Needs in reach and pt is cognitively aware of the call light and his limitations with lines, etc.)  OT Assessment Results: Decreased ADL status, Decreased functional mobility, Decreased IADLs    Plan:  Treatment Interventions: ADL retraining, Functional transfer training, Patient/family training, Neuromuscular reeducation, Compensatory technique education, Equipment evaluation/education  OT Frequency: 5 times per week  OT Discharge Recommendations: No OT needed after discharge  OT Recommended Transfer Status: Stand by assist  OT - OK to Discharge: Yes      Subjective     General:   OT Received On: 24  General  Reason for Referral: s/p R CEA  Referred By: Carlito Jackson, JOSE ALEJANDRO-CNP  Past Medical History Relevant to Rehab: hyperlipidemia, lung nodule, L hand surgery  Family/Caregiver Present: Yes (spouse at bedside)  Co-Treatment: PT  Co-Treatment Reason: Medical complexity s/p R CEA in ICU  Prior to Session Communication: Bedside nurse  Patient Position Received: Bed, 3 rail up, Alarm off, not on at start of session  General Comment: Pt is a 62 yo male admitted to the hospital with Carotid artery stenosis without cerebral infarction and is s/p R CEA by Dr. Umaña on 3/1/2024.    Precautions:  Hearing/Visual Limitations: glasses  Medical  Precautions: Fall precautions    Vital Signs:  Heart Rate: 72  Heart Rate Source: Monitor  SpO2: 97 %  BP: 128/51    Pain:  Pain Assessment  Pain Assessment: 0-10  Pain Score: 0 - No pain    Objective     Cognition:  Overall Cognitive Status: Within Functional Limits  Orientation Level: Oriented X4    Home Living:  Type of Home: House  Lives With: Spouse  Home Adaptive Equipment: None  Home Layout: Two level, Stairs to alternate level without rails  Alternate Level Stairs-Rails: None  Alternate Level Stairs-Number of Steps: 2  Home Access: Level entry  Bathroom Shower/Tub: Tub/shower unit  Bathroom Toilet: Standard  Bathroom Equipment: None    Prior Function:  Level of [place]: Independent with ADLs and functional transfers, Independent with homemaking with ambulation  ADL Assistance: Independent  Homemaking Assistance: Independent  Ambulatory Assistance: Independent  Vocational: Full time employment (Construction)  Hand Dominance: Right  Prior Function Comments: Pt very active and driving PTA.    ADL:  Eating Assistance: Independent  Grooming Assistance: Stand by  Grooming Deficit: Supervision/safety, Standing with assistive device  Bathing Assistance: Stand by  UE Dressing Assistance: Stand by  UE Dressing Deficit: Setup  LE Dressing Assistance: Minimal  Toileting Assistance with Device: Stand by    Activity Tolerance:  Activity Tolerance Comments: WFL    Bed Mobility/Transfers: Bed Mobility  Bed Mobility:  (Pt completed supine to sit in bed with close S for safety while monitoring vitals.)    Transfers  Transfer:  (Pt completed sit<>stand from the bed and chair with close S for safety while monitoring vitals.)    Sitting Balance:  Static Sitting Balance  Static Sitting-Balance Support: Bilateral upper extremity supported  Static Sitting-Level of Assistance: Close supervision  Static Sitting-Comment/Number of Minutes: 2-3 minutes sitting EOB    Standing Balance:  Static Standing Balance  Static  Standing-Balance Support: Bilateral upper extremity supported  Static Standing-Level of Assistance: Close supervision  Static Standing-Comment/Number of Minutes: <1 minute with walker support    Therapy/Activity: Therapeutic Activity  Therapeutic Activity Performed:  (See bed mobility, transfers, static sitting balance, and static standing balance. Pt tolerated functional mobility for a household distance using a RW with CGA to close S for balance/safety and this improved towards the end.)    Sensation:  Sensation Comment: BUE's WFL    Strength:  Strength Comments: BUE's WFL    Coordination:  Coordination Comment: BUE's WFL     Hand Function:  Hand Function  Gross Grasp: Functional  Coordination: Functional      Outcome Measures: [facility] Daily Activity  Putting on and taking off regular lower body clothing: A little  Bathing (including washing, rinsing, drying): A little  Putting on and taking off regular upper body clothing: A little  Toileting, which includes using toilet, bedpan or urinal: A little  Taking care of personal grooming such as brushing teeth: A little  Eating Meals: None  Daily Activity - Total Score: 19      Education Documentation  ADL Training, taught by Sundar Velasco Jr., OT at 3/2/2024  9:24 AM.  Learner: Patient  Readiness: Acceptance  Method: Explanation  Response: Verbalizes Understanding    Education Comments  No comments found.         Goals:  Encounter Problems       Encounter Problems (Active)       OT Goals       Pt will complete all functional transfers and mobility with mod indep/indep using a device or no device. (Progressing)       Start:  03/02/24    Expected End:  03/30/24            Pt will tolerate functional mobility for a household/community distance using a device or no device with mod indep. (Progressing)       Start:  03/02/24    Expected End:  03/30/24            Pt will complete all ADL's/IADL's with mod indep/indep using a device or no device. (Progressing)       Start:   03/02/24    Expected End:  03/30/24

## 2024-03-02 NOTE — PROGRESS NOTES
[facility] Critical Care Medicine       Date:  3/2/2024  Patient:  Dirk Gibson  YOB: 1960  MRN:  [id number]   Admit Date:  3/1/2024  ========================================================================================================    Chief complaint: lightheadedness        History of Present Illness:  Dirk Gibson is a 63 year old male with history of smoker. Patient reports symptoms started over the summer while working that consisted of lightheadedness with exertion. With reports that during these episodes patient also became confused and had altered speech. Patient also reports one event of leg numbness lasting for multiple hours.      Patient now s/p Carotid endarterectomy with Dr. Umaña admitted to the ICU for frequent neuro assessments.      Interval ICU Events:  3/1: arrived to ICU from PACU    3/2: Pt has having elevated SBP overnight requiring multiple doses of hydralazine for goal SBP <140 which has now been well controlled this morning. Pt is doing well and has worked with PT and walked down the hallway with no complaints. Advanced to regular diet. Will downgrade to stepdown today.     Medical History:  Past Medical History:   Diagnosis Date    Hyperlipidemia     Lung nodule      Past Surgical History:   Procedure Laterality Date    HAND SURGERY Left      Medications Prior to Admission   Medication Sig Dispense Refill Last Dose    aspirin 81 mg chewable tablet Chew 1 tablet (81 mg) once daily. 360 tablet 0 2024 at 2000    [] chlorhexidine (Peridex) 0.12 % solution Use as directed. Do not start before 2024. 473 mL 0 3/1/2024 at 0400    clopidogrel (Plavix) 75 mg tablet Take 1 tablet (75 mg) by mouth once daily for 20 days. 20 tablet 0 2024 at 2000    rosuvastatin (Crestor) 20 mg tablet Take 1 tablet (20 mg) by mouth once daily. 90 tablet 1 2024 at 2000     Patient has no known allergies.  Social History     Tobacco Use    Smoking status: Former      Packs/day: 0.50     Years: 54.00     Additional pack years: 0.00     Total pack years: 27.00     Types: Cigarettes     Start date: 1970     Quit date:      Years since quittin.1    Smokeless tobacco: Former     Types: Chew     Quit date:    Vaping Use    Vaping Use: Never used   Substance Use Topics    Alcohol use: Not Currently    Drug use: Never     Family History   Problem Relation Name Age of Onset    No Known Problems Mother      Leukemia Father      Diabetes Father      Cancer Father      No Known Problems Sister      No Known Problems Brother      No Known Problems Daughter      No Known Problems Son      No Known Problems Son      No Known Problems Son      No Known Problems Son      No Known Problems Maternal Grandmother      No Known Problems Maternal Grandfather      No Known Problems Paternal Grandmother      No Known Problems Paternal Grandfather         [facility] Medications:           Current Facility-Administered Medications:     acetaminophen (Tylenol) tablet 650 mg, 650 mg, oral, q4h PRN, Mary Quevedo APRN-CNP, 650 mg at 24 0159    aspirin chewable tablet 81 mg, 81 mg, oral, Daily, Mary Quevedo APRN-CNP, 81 mg at 24 0949    atorvastatin (Lipitor) tablet 40 mg, 40 mg, oral, Nightly, Mary Quevedo APRN-CNP, 40 mg at 24    hydrALAZINE (Apresoline) injection 10 mg, 10 mg, intravenous, q4h PRN, Mary Quevedo APRN-CNP, 10 mg at 24 0110    HYDROmorphone (Dilaudid) injection 0.5 mg, 0.5 mg, intravenous, q4h PRN, Mary Quevedo APRN-CNP    labetaloL (Normodyne,Trandate) injection 20 mg, 20 mg, intravenous, q6h PRN, Carlito Jackson, APRN-CNP, 20 mg at 24 0437    labetaloL (Normodyne,Trandate) injection 20 mg, 20 mg, intravenous, Once, Carlito Jackson, APRN-CNP    naloxone (Narcan) injection 0.2 mg, 0.2 mg, intravenous, q5 min PRN, Mary Quevedo APRN-CNP    ondansetron (Zofran) injection 4 mg, 4 mg, intravenous, q6h PRN, Mary  YUSUF Bowers    oxyCODONE (Roxicodone) immediate release tablet 5 mg, 5 mg, oral, q6h PRN, YUSUF Bray    oxygen (O2) therapy, , inhalation, Continuous PRN - O2/gases, YUSUF Bray    Review of Systems:  14 point review of systems was completed and negative except for those specially mention in my HPI    Physical Exam:    Heart Rate:  [62-85]   Temp:  [36.1 °C (97 °F)-37.4 °C (99.3 °F)]   Resp:  [10-22]   BP: (120-160)/(51-80)   Height:  [182.9 cm (6')]   Weight:  [115 kg (252 lb 10.4 oz)-115 kg (253 lb 15.5 oz)]   SpO2:  [92 %-100 %]     Physical Exam  Vitals reviewed.   HENT:      Head: Normocephalic and atraumatic.      Mouth/Throat:      Mouth: Mucous membranes are dry.   Eyes:      Extraocular Movements: Extraocular movements intact.      Pupils: Pupils are equal, round, and reactive to light.   Cardiovascular:      Rate and Rhythm: Normal rate and regular rhythm.      Pulses: Normal pulses.      Heart sounds: Normal heart sounds.   Pulmonary:      Effort: Pulmonary effort is normal.      Breath sounds: Normal breath sounds.   Abdominal:      General: Bowel sounds are normal. There is no distension.      Palpations: Abdomen is soft.      Tenderness: There is no abdominal tenderness.   Musculoskeletal:      Cervical back: Normal range of motion.      Right lower leg: No edema.      Left lower leg: No edema.   Skin:     General: Skin is warm.      Capillary Refill: Capillary refill takes less than 2 seconds.      Comments: Incision to Right neck with dry gauze bandage, slight swelling to area, no tenderness or drainage  Rash to right upper chest and neck around, around incision/dressing, patient reports no tenderness, swelling, itching, burning or irritation      Neurological:      General: No focal deficit present.      Mental Status: He is oriented to person, place, and time.         Objective:    I have reviewed all medications, laboratory results, and imaging pertinent for  today's encounter.           Intake/Output Summary (Last 24 hours) at 3/2/2024 1156  Last data filed at 3/2/2024 1000  Gross per 24 hour   Intake 2393.32 ml   Output 1970 ml   Net 423.32 ml         Assessment/Plan:    I am currently managing this critically ill patient for the following problems:    Neuro/Psych/Pain Ctrl/Sedation:  Post-op pain  - Tylenol for mild pain  - Oxycodone for moderate pain  - Dilaudid for severe pain  - CAM ICU qshift, sleep-wake hygiene, delirium precautions     Respiratory/ENT:  Hx tobacco use   - On RA  - Maintain SPO2 >92%  - Continuous pulse ox monitoring     Cardiovascular:  S/p carotid endarterectomy   Hx HLD  - Hydralazine and labetalol PRN to maintain SBP <140  - Continue aspirin and lipitor   - Continuous cardiac monitoring per ICU protocol  - Maintain MAPS >65  - EKGs PRN for ACS symptoms     GI:  No active concerns   - Diet: regular diet   - BR with colace PRN  - Zofran PRN for nausea     Renal/Volume Status (Intra & Extravascular):  No active concerns   - Maintain urine output 0.5-1.0cc/kg/hr  - Monitor I/O's  - Replete electrolytes to maintain K >4.0 and Mg >2.0  - Daily BMP, Mg    Endocrine  No active concerns   - A1c 5.8 (1/24)  - Monitor for hyper/hypoglycemia     Infectious Disease:  No active concerns   - Antibiotics: Post op Cefazolin for surgical prophylaxis for 2 doses   - Monitor SIRS criteria    Heme/Onc:  No active concerns   - Monitor for s/sx of anemia such as bleeding and bruising   - Transfuse if Hgb <7.0   - Daily CBC    MSK:  No active concerns   - Padded pressure points   - PT/OT  - Ambulatory at baseline    Derm:  - ICU skin protocol    Ethics/Code Status:  Full code     :  DVT Prophylaxis: none  GI Prophylaxis: none  Bowel Regimen: colace  Diet: regular  CVC: none  [place]: none  [place]: none  Restraints: none  Dispo: transfer to stepdown     Critical Care Time:  45 minutes spent in preparing to see patient (I.e.labs,imaging, etc.),  documentation, discussion plan of care with patient/family/caregiver, and/ or coordination of care with multidisciplinary team including the attending. Time does not include completion of procedure time.     Plan discussed with Dr. Villeda.    Favio Wilcox PA-C  Pulmonology & Critical Care Medicine   [facility]

## 2024-03-03 VITALS
SYSTOLIC BLOOD PRESSURE: 141 MMHG | TEMPERATURE: 97.5 F | OXYGEN SATURATION: 96 % | WEIGHT: 253.75 LBS | DIASTOLIC BLOOD PRESSURE: 73 MMHG | HEIGHT: 72 IN | BODY MASS INDEX: 34.37 KG/M2 | HEART RATE: 78 BPM | RESPIRATION RATE: 13 BRPM

## 2024-03-03 LAB
ANION GAP SERPL CALC-SCNC: 10 MMOL/L
BUN SERPL-MCNC: 16 MG/DL (ref 8–25)
CALCIUM SERPL-MCNC: 8.9 MG/DL (ref 8.5–10.4)
CHLORIDE SERPL-SCNC: 102 MMOL/L (ref 97–107)
CO2 SERPL-SCNC: 25 MMOL/L (ref 24–31)
CREAT SERPL-MCNC: 0.8 MG/DL (ref 0.4–1.6)
EGFRCR SERPLBLD CKD-EPI 2021: >90 ML/MIN/1.73M*2
ERYTHROCYTE [DISTWIDTH] IN BLOOD BY AUTOMATED COUNT: 14.3 % (ref 11.5–14.5)
GLUCOSE SERPL-MCNC: 117 MG/DL (ref 65–99)
HCT VFR BLD AUTO: 40 % (ref 41–52)
HGB BLD-MCNC: 13.4 G/DL (ref 13.5–17.5)
MAGNESIUM SERPL-MCNC: 2.2 MG/DL (ref 1.6–3.1)
MCH RBC QN AUTO: 30.6 PG (ref 26–34)
MCHC RBC AUTO-ENTMCNC: 33.5 G/DL (ref 32–36)
MCV RBC AUTO: 91 FL (ref 80–100)
NRBC BLD-RTO: 0 /100 WBCS (ref 0–0)
PLATELET # BLD AUTO: 204 X10*3/UL (ref 150–450)
POTASSIUM SERPL-SCNC: 4.2 MMOL/L (ref 3.4–5.1)
RBC # BLD AUTO: 4.38 X10*6/UL (ref 4.5–5.9)
SODIUM SERPL-SCNC: 137 MMOL/L (ref 133–145)
WBC # BLD AUTO: 7.8 X10*3/UL (ref 4.4–11.3)

## 2024-03-03 PROCEDURE — 85027 COMPLETE CBC AUTOMATED: CPT | Performed by: NURSE PRACTITIONER

## 2024-03-03 PROCEDURE — 36415 COLL VENOUS BLD VENIPUNCTURE: CPT | Performed by: NURSE PRACTITIONER

## 2024-03-03 PROCEDURE — 2500000001 HC RX 250 WO HCPCS SELF ADMINISTERED DRUGS (ALT 637 FOR MEDICARE OP)

## 2024-03-03 PROCEDURE — 80048 BASIC METABOLIC PNL TOTAL CA: CPT | Performed by: NURSE PRACTITIONER

## 2024-03-03 PROCEDURE — 99239 HOSP IP/OBS DSCHRG MGMT >30: CPT | Performed by: NURSE PRACTITIONER

## 2024-03-03 PROCEDURE — 83735 ASSAY OF MAGNESIUM: CPT

## 2024-03-03 RX ORDER — ROSUVASTATIN CALCIUM 40 MG/1
40 TABLET, COATED ORAL DAILY
Qty: 30 TABLET | Refills: 3 | Status: SHIPPED | OUTPATIENT
Start: 2024-03-03 | End: 2024-06-11 | Stop reason: SDUPTHER

## 2024-03-03 RX ADMIN — ACETAMINOPHEN 650 MG: 325 TABLET ORAL at 11:16

## 2024-03-03 RX ADMIN — ASPIRIN 81 MG 81 MG: 81 TABLET ORAL at 09:21

## 2024-03-03 ASSESSMENT — PAIN SCALES - GENERAL
PAINLEVEL_OUTOF10: 0 - NO PAIN
PAINLEVEL_OUTOF10: 0 - NO PAIN
PAINLEVEL_OUTOF10: 1
PAINLEVEL_OUTOF10: 0 - NO PAIN
PAINLEVEL_OUTOF10: 0 - NO PAIN

## 2024-03-03 ASSESSMENT — PAIN - FUNCTIONAL ASSESSMENT
PAIN_FUNCTIONAL_ASSESSMENT: 0-10

## 2024-03-03 ASSESSMENT — PAIN DESCRIPTION - DESCRIPTORS: DESCRIPTORS: ACHING

## 2024-03-03 NOTE — DISCHARGE SUMMARY
Discharge Diagnosis  Carotid artery stenosis without cerebral infarction, bilateral    Issues Requiring Follow-Up  Right carotid artery incision follow-up    Test Results Pending At Discharge  Pending Labs       Order Current Status    Surgical Pathology Exam In process            Hospital Course   This is a pleasant 63-year-old  male who who was found to have functional occlusion of the left ICA and 50 to 69% stenosis of the right internal carotid artery with duplex peak systolic velocities of 550/166 cm,/sec. He was having symptoms of lightheadedness, worse with exertion over the course of the summer.  Patient underwent right carotid endarterectomy on 3/1/2024 by Dr. Umaña.  He was admitted to the ICU for postoperative monitoring of his blood pressure as well as neurologic status.  The patient recovered very well and was up walking the halls on postoperative day 1.  He did complain of a mild headache on postoperative day 1.  The patient had not had any significant oral intake or coffee.  He reportedly drinks 3 to 4 pots of coffee per day and so I suspect this was partially related to caffeine withdrawal.  Once he ate and drank a cup of coffee, he felt much better with no further headache.  He has had very minimal pain postoperatively.  He will be discharged on postoperative day #2 with appropriate follow-up instructions.  He will follow-up in the office in 2 weeks for an incision check.    Pertinent Physical Exam At Time of Discharge  Physical Exam  General: Pt is alert and oriented x 3. Pleasant, conversive  HEENT: Head is atraumatic, normocephalic.  Right neck surgical incision with Steri-Strips in place.  Mild hematoma to the right neck, soft, not taut.  Cardiac: Normal S1-S2.  Regular rate and rhythm.  No murmurs.  Respiratory: Lungs clear to auscultation.  No adventitious sounds.  Abdomen: Soft, nondistended, nontender.  Bowel sounds x4 quadrants.  Pulse exam: Palpable brachial and radial pulses  bilaterall.  Bilateral lower extremities are warm and well-perfused  Extremities: No significant edema noted.  Extremities are warm to the touch and normal in color.  No open wounds or sores.  Neuro: Moves all extremities spontaneously.  No focal deficits.  Psych: Appropriate affect.  Answers questions appropriately.  Home Medications     Medication List      CHANGE how you take these medications     rosuvastatin 40 mg tablet; Commonly known as: Crestor; Take 1 tablet (40   mg) by mouth once daily.; What changed: medication strength, how much to   take     CONTINUE taking these medications     aspirin 81 mg chewable tablet; Chew 1 tablet (81 mg) once daily.     STOP taking these medications     chlorhexidine 0.12 % solution; Commonly known as: Peridex   clopidogrel 75 mg tablet; Commonly known as: Plavix       Outpatient Follow-Up  Future Appointments   Date Time Provider Department Center   3/15/2024 11:00 AM MD [facility] Clay Stahl APRN-CNP

## 2024-03-03 NOTE — CARE PLAN
The patient's goals for the shift include get some sleep    The clinical goals for the shift include hemodynamically stable, no bleeding at the insicion site    Over the shift, the patient did not make progress toward the following goals. Barriers to progression include . Recommendations to address these barriers include .

## 2024-03-03 NOTE — DISCHARGE INSTRUCTIONS
What to Expect at Home:  You should be able to do most of your normal activities within 3 to 4 weeks. You may have a slight neck ache for about 2 weeks.    You may start doing everyday activities as soon as you feel up to it. You may need help with meals, taking care of the house, and shopping at first.    Do not drive until your incision is healed, and you can turn your head without discomfort.    You may have some numbness along your jaw and near your earlobe. This is from the incision. Most of the time, this goes away in 6 to 12 months.    Self-care:  You may shower when you get home. It is ok if the surgical tape on your incision gets wet. Do not soak, scrub, or have shower water beat directly on the tape. The tape will curl up and fall off on its own after about a week.  Look carefully at your incision every day for any changes. Do not put lotion, cream, or herbal remedies on it without asking your health care provider first if it is ok.  Until the incision heals, do not wear turtlenecks or other clothes around your neck that rub against the incision.  Having carotid artery surgery does not cure the cause of the blockage in your arteries. Your arteries may become narrow again. To prevent this:    Eat healthy foods, exercise (if your provider advises you to), stop smoking (if you smoke), and reduce your stress level.  Take medicine to help lower your cholesterol if your provider prescribes it.  If you are taking medicines for high blood pressure or diabetes, take them the way you have been told to take them.  You may be instructed to take aspirin and/or a medicine called clopidogrel (Plavix), or another medicine when you go home. These medicines keep your blood from forming clots in your arteries and in the stent. DO NOT stop taking them without talking with your provider first.    Contact your provider if:    You have a headache, become confused, or have numbness or weakness in any part of your body.  You have  problems with your eyesight, you cannot talk normally, or you have trouble understanding what other people are saying.  You cannot move your tongue to the side of your mouth.  You have trouble swallowing.  You have chest pain, dizziness, or shortness of breath that does not go away with rest.  You are coughing up blood or yellow or green mucus.  You have chills or a fever over 101°F (38.3°C) or a fever that does not go away after you take acetaminophen (Tylenol).  Your incision becomes red or painful, or yellow or green discharge is draining from it.  Your legs are swelling.

## 2024-03-04 ENCOUNTER — DOCUMENTATION (OUTPATIENT)
Dept: PRIMARY CARE | Facility: CLINIC | Age: 64
End: 2024-03-04

## 2024-03-04 ENCOUNTER — PATIENT OUTREACH (OUTPATIENT)
Dept: PRIMARY CARE | Facility: CLINIC | Age: 64
End: 2024-03-04

## 2024-03-04 ENCOUNTER — APPOINTMENT (OUTPATIENT)
Dept: PRIMARY CARE | Facility: CLINIC | Age: 64
End: 2024-03-04
Payer: COMMERCIAL

## 2024-03-04 NOTE — PROGRESS NOTES
Discharge Facility: [facility]  Discharge Diagnosis: Carotid artery stenosis without cerebral infarction  Admission Date: 03/01/2024  Discharge Date: 03/03/2024    PCP Appointment Date: 03/15/2024  Specialist Appointment Date: None  Hospital Encounter and Summary: Linked    See discharge assessment below for further details      Engagement  Call Start Time: 0930 (3/4/2024  9:30 AM)    Medications  Is the patient having any side effects they believe may be caused by any medication additions or changes?: No (3/4/2024  9:30 AM)  Does the patient have all medications ordered at discharge?: Yes (3/4/2024  9:30 AM)  Prescription Comments: No new scripts given at discharge (3/4/2024  9:30 AM)  Is the patient taking all medications as directed (includes completed medication regime)?: Yes (3/4/2024  9:30 AM)  Medication Comments: Patients wife denies any issues affording medication (3/4/2024  9:30 AM)    Appointments  Does the patient have a primary care provider?: Yes (3/4/2024  9:30 AM)  Care Management Interventions: Verified appointment date/time/provider (3/4/2024  9:30 AM)  Has the patient kept scheduled appointments due by today?: Yes (3/4/2024  9:30 AM)    Self Management  What is the home health agency?: N/A (3/4/2024  9:30 AM)  What Durable Medical Equipment (DME) was ordered?: N/A (3/4/2024  9:30 AM)    Patient Teaching  Does the patient have access to their discharge instructions?: Yes (3/4/2024  9:30 AM)  Care Management Interventions: Reviewed instructions with patient (3/4/2024  9:30 AM)  What is the patient's perception of their health status since discharge?: Improving (3/4/2024  9:30 AM)  Is the patient/caregiver able to teach back the hierarchy of who to call/visit for symptoms/problems? PCP, Specialist, Home Health nurse, Urgent Care, ED, 911: Yes (3/4/2024  9:30 AM)  Patient/Caregiver Education Comments: CM spoke to patients wife via phone. She states that he is doing great at home. He has been taking  Tylenol and Ibuprofen to control his pain/discomfort. He has all needed medication at home. He has a follow up appointment schedule with his PCP on 03/15/2024. They have no questions at this time. Tatianna was very thankful for this call. (3/4/2024  9:30 AM)

## 2024-03-06 LAB
LABORATORY COMMENT REPORT: NORMAL
PATH REPORT.FINAL DX SPEC: NORMAL
PATH REPORT.GROSS SPEC: NORMAL
PATH REPORT.RELEVANT HX SPEC: NORMAL
PATH REPORT.TOTAL CANCER: NORMAL

## 2024-03-10 LAB
ATRIAL RATE: 54 BPM
P AXIS: 30 DEGREES
P OFFSET: 200 MS
P ONSET: 152 MS
PR INTERVAL: 138 MS
Q ONSET: 221 MS
QRS COUNT: 9 BEATS
QRS DURATION: 84 MS
QT INTERVAL: 410 MS
QTC CALCULATION(BAZETT): 388 MS
QTC FREDERICIA: 395 MS
R AXIS: 50 DEGREES
T AXIS: 50 DEGREES
T OFFSET: 426 MS
VENTRICULAR RATE: 54 BPM

## 2024-03-12 ENCOUNTER — OFFICE VISIT (OUTPATIENT)
Dept: VASCULAR SURGERY | Facility: CLINIC | Age: 64
End: 2024-03-12
Payer: COMMERCIAL

## 2024-03-12 VITALS — HEART RATE: 54 BPM | SYSTOLIC BLOOD PRESSURE: 143 MMHG | DIASTOLIC BLOOD PRESSURE: 79 MMHG

## 2024-03-12 DIAGNOSIS — I65.23 BILATERAL CAROTID ARTERY STENOSIS: Primary | ICD-10-CM

## 2024-03-12 PROCEDURE — 99024 POSTOP FOLLOW-UP VISIT: CPT | Performed by: SURGERY

## 2024-03-12 PROCEDURE — 3008F BODY MASS INDEX DOCD: CPT | Performed by: SURGERY

## 2024-03-12 ASSESSMENT — PAIN SCALES - GENERAL: PAINLEVEL: 0-NO PAIN

## 2024-03-12 NOTE — PROGRESS NOTES
Patient comes for follow-up after right carotid endarterectomy.  He did quite well.  He has completely healed his wounds.  He is neurologically intact.  He had a heavily calcified vessel with some inflammation.  He is taking a daily aspirin.    I would bring back in 3 months for carotid duplex.  No restrictions on his activity.

## 2024-03-15 ENCOUNTER — OFFICE VISIT (OUTPATIENT)
Dept: PRIMARY CARE | Facility: CLINIC | Age: 64
End: 2024-03-15
Payer: COMMERCIAL

## 2024-03-15 VITALS
OXYGEN SATURATION: 98 % | DIASTOLIC BLOOD PRESSURE: 74 MMHG | HEART RATE: 62 BPM | TEMPERATURE: 97.8 F | BODY MASS INDEX: 33.86 KG/M2 | WEIGHT: 250 LBS | HEIGHT: 72 IN | SYSTOLIC BLOOD PRESSURE: 128 MMHG

## 2024-03-15 DIAGNOSIS — Z09 HOSPITAL DISCHARGE FOLLOW-UP: Primary | ICD-10-CM

## 2024-03-15 DIAGNOSIS — E78.00 HYPERCHOLESTEROLEMIA: ICD-10-CM

## 2024-03-15 PROCEDURE — 3008F BODY MASS INDEX DOCD: CPT | Performed by: STUDENT IN AN ORGANIZED HEALTH CARE EDUCATION/TRAINING PROGRAM

## 2024-03-15 PROCEDURE — 99495 TRANSJ CARE MGMT MOD F2F 14D: CPT | Performed by: STUDENT IN AN ORGANIZED HEALTH CARE EDUCATION/TRAINING PROGRAM

## 2024-03-15 NOTE — PROGRESS NOTES
"Patient: Dirk Gibson  : 1960  PCP: Sher Helton MD  MRN: [id number]  Program: Transitional Care Management  Status: Enrolled  Effective Dates: 3/4/2024 - present  Responsible Staff: Mary Kay Slade  Social Determinants to be Addressed: Physical Activity, Social Connections, Stress, Tobacco Use         Dirk Gibson is a 63 y.o. male presenting today for follow-up after being discharged from the hospital 12 days ago. The main problem requiring admission was carotid artery stenosis . The discharge summary and/or Transitional Care Management documentation was reviewed. Medication reconciliation was performed as indicated via the \"Sidney as Reviewed\" timestamp.     Dirk Gibson was contacted by Transitional Care Management services two days after his discharge. This encounter and supporting documentation was reviewed.    Review of Systems   All other systems reviewed and are negative.      /74   Pulse 62   Temp 36.6 °C (97.8 °F)   Ht 1.829 m (6')   Wt 113 kg (250 lb)   SpO2 98%   BMI 33.91 kg/m²     Physical Exam  Constitutional:       Appearance: Normal appearance.   HENT:      Head: Normocephalic and atraumatic.      Right Ear: External ear normal.      Left Ear: External ear normal.      Nose: Nose normal. No congestion or rhinorrhea.   Eyes:      General: No scleral icterus.     Extraocular Movements: Extraocular movements intact.      Conjunctiva/sclera: Conjunctivae normal.   Cardiovascular:      Rate and Rhythm: Normal rate and regular rhythm.      Pulses: Normal pulses.      Heart sounds: Normal heart sounds.   Pulmonary:      Effort: Pulmonary effort is normal.      Breath sounds: Normal breath sounds.   Musculoskeletal:         General: Normal range of motion.      Cervical back: Normal range of motion and neck supple.   Skin:     General: Skin is warm and dry.      Capillary Refill: Capillary refill takes less than 2 seconds.      Comments: Right sided neck sutures well healing, no erythema, " minimal edema, ROM of neck appropriate    Neurological:      General: No focal deficit present.      Mental Status: He is alert and oriented to person, place, and time. Mental status is at baseline.   Psychiatric:         Mood and Affect: Mood normal.         Behavior: Behavior normal.         Thought Content: Thought content normal.         Judgment: Judgment normal.         The complexity of medical decision making for this patient's transitional care is moderate.    Assessment/Plan   Diagnoses and all orders for this visit:  Hospital discharge follow-up  -     CBC; Future  Hypercholesterolemia  -     Comprehensive Metabolic Panel; Future    Dirk is a 64yo M presenting with a hospital discharge follow up 12 days from carotid endarterectomy. Seen by vascular surgery who has cleared him until follow up 3months from now.   Absorbable sutures without any infectious signs   Patient and wife will return for formal wellness visit for screening tests and imaging  Patient has minimal swelling on right side of neck with some numbness around ears ROM of neck appropriate  Previous abnormal CBC previously on EMR, repeat CBC after hospital discharge  CMP repeat after taking statin for follow up and management  I have personally reviewed all available pertinent labs, imaging, and consult notes with the patient.     All questions and concerns were addressed. Patient verbalizes understanding instructions and agrees with established plan of care.     Patient seen and discussed with Dr. Danie Suggs MD

## 2024-03-18 ENCOUNTER — PATIENT OUTREACH (OUTPATIENT)
Dept: PRIMARY CARE | Facility: CLINIC | Age: 64
End: 2024-03-18
Payer: COMMERCIAL

## 2024-03-18 NOTE — PROGRESS NOTES
Call regarding appt. with PCP on 03/15/2024 after hospitalization.  At time of outreach call the patient feels as if their condition has improved since last visit.  Reviewed the PCP appointment with the pt and addressed any questions or concerns.

## 2024-04-01 ENCOUNTER — PATIENT OUTREACH (OUTPATIENT)
Dept: PRIMARY CARE | Facility: CLINIC | Age: 64
End: 2024-04-01
Payer: COMMERCIAL

## 2024-04-09 ENCOUNTER — TELEPHONE (OUTPATIENT)
Dept: RADIOLOGY | Facility: HOSPITAL | Age: 64
End: 2024-04-09
Payer: COMMERCIAL

## 2024-04-10 DIAGNOSIS — R91.8 ABNORMAL CT LUNG SCREENING: Primary | ICD-10-CM

## 2024-04-11 ENCOUNTER — TELEPHONE (OUTPATIENT)
Dept: PRIMARY CARE | Facility: CLINIC | Age: 64
End: 2024-04-11
Payer: COMMERCIAL

## 2024-05-24 ENCOUNTER — PATIENT OUTREACH (OUTPATIENT)
Dept: PRIMARY CARE | Facility: CLINIC | Age: 64
End: 2024-05-24
Payer: COMMERCIAL

## 2024-06-07 ENCOUNTER — LAB (OUTPATIENT)
Dept: LAB | Facility: LAB | Age: 64
End: 2024-06-07
Payer: COMMERCIAL

## 2024-06-07 DIAGNOSIS — Z09 HOSPITAL DISCHARGE FOLLOW-UP: ICD-10-CM

## 2024-06-07 DIAGNOSIS — E78.2 MIXED HYPERLIPIDEMIA: ICD-10-CM

## 2024-06-07 DIAGNOSIS — E78.00 HYPERCHOLESTEROLEMIA: ICD-10-CM

## 2024-06-07 DIAGNOSIS — I65.23 CAROTID ARTERY STENOSIS WITHOUT CEREBRAL INFARCTION, BILATERAL: ICD-10-CM

## 2024-06-07 LAB
ALBUMIN SERPL BCP-MCNC: 3.9 G/DL (ref 3.4–5)
ALP SERPL-CCNC: 84 U/L (ref 33–136)
ALT SERPL W P-5'-P-CCNC: 31 U/L (ref 10–52)
ANION GAP SERPL CALC-SCNC: 10 MMOL/L (ref 10–20)
AST SERPL W P-5'-P-CCNC: 27 U/L (ref 9–39)
BILIRUB SERPL-MCNC: 1.2 MG/DL (ref 0–1.2)
BUN SERPL-MCNC: 23 MG/DL (ref 6–23)
CALCIUM SERPL-MCNC: 8.4 MG/DL (ref 8.6–10.3)
CHLORIDE SERPL-SCNC: 104 MMOL/L (ref 98–107)
CO2 SERPL-SCNC: 32 MMOL/L (ref 21–32)
CREAT SERPL-MCNC: 0.82 MG/DL (ref 0.5–1.3)
EGFRCR SERPLBLD CKD-EPI 2021: >90 ML/MIN/1.73M*2
ERYTHROCYTE [DISTWIDTH] IN BLOOD BY AUTOMATED COUNT: 12.7 % (ref 11.5–14.5)
GLUCOSE SERPL-MCNC: 86 MG/DL (ref 74–99)
HCT VFR BLD AUTO: 42.4 % (ref 41–52)
HGB BLD-MCNC: 13.5 G/DL (ref 13.5–17.5)
MCH RBC QN AUTO: 30.8 PG (ref 26–34)
MCHC RBC AUTO-ENTMCNC: 31.8 G/DL (ref 32–36)
MCV RBC AUTO: 97 FL (ref 80–100)
NRBC BLD-RTO: 0 /100 WBCS (ref 0–0)
PLATELET # BLD AUTO: 148 X10*3/UL (ref 150–450)
POTASSIUM SERPL-SCNC: 4.4 MMOL/L (ref 3.5–5.3)
PROT SERPL-MCNC: 5.9 G/DL (ref 6.4–8.2)
RBC # BLD AUTO: 4.38 X10*6/UL (ref 4.5–5.9)
SODIUM SERPL-SCNC: 142 MMOL/L (ref 136–145)
WBC # BLD AUTO: 5.3 X10*3/UL (ref 4.4–11.3)

## 2024-06-07 PROCEDURE — 36415 COLL VENOUS BLD VENIPUNCTURE: CPT

## 2024-06-07 PROCEDURE — 85027 COMPLETE CBC AUTOMATED: CPT

## 2024-06-07 PROCEDURE — 80053 COMPREHEN METABOLIC PANEL: CPT

## 2024-06-07 PROCEDURE — 80061 LIPID PANEL: CPT

## 2024-06-11 ENCOUNTER — ANCILLARY PROCEDURE (OUTPATIENT)
Dept: VASCULAR MEDICINE | Facility: CLINIC | Age: 64
End: 2024-06-11
Payer: COMMERCIAL

## 2024-06-11 ENCOUNTER — OFFICE VISIT (OUTPATIENT)
Dept: VASCULAR SURGERY | Facility: CLINIC | Age: 64
End: 2024-06-11
Payer: COMMERCIAL

## 2024-06-11 ENCOUNTER — OFFICE VISIT (OUTPATIENT)
Dept: PRIMARY CARE | Facility: CLINIC | Age: 64
End: 2024-06-11
Payer: COMMERCIAL

## 2024-06-11 VITALS
WEIGHT: 242 LBS | HEIGHT: 72 IN | SYSTOLIC BLOOD PRESSURE: 132 MMHG | BODY MASS INDEX: 32.78 KG/M2 | TEMPERATURE: 98.4 F | DIASTOLIC BLOOD PRESSURE: 80 MMHG | HEART RATE: 74 BPM | OXYGEN SATURATION: 99 %

## 2024-06-11 VITALS — DIASTOLIC BLOOD PRESSURE: 67 MMHG | HEART RATE: 56 BPM | SYSTOLIC BLOOD PRESSURE: 135 MMHG

## 2024-06-11 DIAGNOSIS — I65.23 BILATERAL CAROTID ARTERY STENOSIS: ICD-10-CM

## 2024-06-11 DIAGNOSIS — I65.23 CAROTID ARTERY STENOSIS WITHOUT CEREBRAL INFARCTION, BILATERAL: ICD-10-CM

## 2024-06-11 DIAGNOSIS — I65.23 BILATERAL CAROTID ARTERY STENOSIS: Primary | ICD-10-CM

## 2024-06-11 DIAGNOSIS — E78.2 MIXED HYPERLIPIDEMIA: Primary | ICD-10-CM

## 2024-06-11 LAB
CHOLEST SERPL-MCNC: 132 MG/DL (ref 0–199)
CHOLESTEROL/HDL RATIO: 3.4
HDLC SERPL-MCNC: 38.4 MG/DL
LDLC SERPL CALC-MCNC: 66 MG/DL
NON HDL CHOLESTEROL: 94 MG/DL (ref 0–149)
TRIGL SERPL-MCNC: 136 MG/DL (ref 0–149)
VLDL: 27 MG/DL (ref 0–40)

## 2024-06-11 PROCEDURE — 99213 OFFICE O/P EST LOW 20 MIN: CPT | Performed by: SURGERY

## 2024-06-11 PROCEDURE — 93880 EXTRACRANIAL BILAT STUDY: CPT | Performed by: SURGERY

## 2024-06-11 PROCEDURE — 3008F BODY MASS INDEX DOCD: CPT | Performed by: SURGERY

## 2024-06-11 PROCEDURE — 99213 OFFICE O/P EST LOW 20 MIN: CPT | Performed by: STUDENT IN AN ORGANIZED HEALTH CARE EDUCATION/TRAINING PROGRAM

## 2024-06-11 PROCEDURE — 93880 EXTRACRANIAL BILAT STUDY: CPT

## 2024-06-11 PROCEDURE — 3008F BODY MASS INDEX DOCD: CPT | Performed by: STUDENT IN AN ORGANIZED HEALTH CARE EDUCATION/TRAINING PROGRAM

## 2024-06-11 RX ORDER — ROSUVASTATIN CALCIUM 40 MG/1
40 TABLET, COATED ORAL DAILY
Qty: 90 TABLET | Refills: 1 | Status: SHIPPED | OUTPATIENT
Start: 2024-06-11

## 2024-06-11 ASSESSMENT — ENCOUNTER SYMPTOMS
DYSPHORIC MOOD: 0
CHILLS: 0
SINUS PAIN: 0
ARTHRALGIAS: 0
CONSTIPATION: 0
WHEEZING: 0
FATIGUE: 0
PALPITATIONS: 0
SINUS PRESSURE: 0
MYALGIAS: 0
SHORTNESS OF BREATH: 0
DYSURIA: 0
COUGH: 0
FEVER: 0
DIZZINESS: 0
VOMITING: 0
FREQUENCY: 0
RHINORRHEA: 0
LIGHT-HEADEDNESS: 0
DIARRHEA: 0
NAUSEA: 0
NERVOUS/ANXIOUS: 0
WOUND: 0

## 2024-06-11 ASSESSMENT — PATIENT HEALTH QUESTIONNAIRE - PHQ9
2. FEELING DOWN, DEPRESSED OR HOPELESS: NOT AT ALL
SUM OF ALL RESPONSES TO PHQ9 QUESTIONS 1 AND 2: 0
1. LITTLE INTEREST OR PLEASURE IN DOING THINGS: NOT AT ALL
2. FEELING DOWN, DEPRESSED OR HOPELESS: NOT AT ALL
SUM OF ALL RESPONSES TO PHQ9 QUESTIONS 1 AND 2: 0
1. LITTLE INTEREST OR PLEASURE IN DOING THINGS: NOT AT ALL

## 2024-06-11 ASSESSMENT — PAIN SCALES - GENERAL: PAINLEVEL: 0-NO PAIN

## 2024-06-11 NOTE — PROGRESS NOTES
Patient returns for follow-up after carotid duplex.  Back in March he had a carotid endarterectomy on the right side with good healing.  He said no stroke symptoms.  He had a duplex today showing the right side to be widely patent and grossly normal.  The left side has severe calcified plaque with some slivers of flow demonstrated.  The CT angiogram done preoperatively showed this to be a occlusive lesion and these tend to be quite high risk compared to standard severe lesions.  The patient and I had a phuong discussion regarding the various risks and benefits of tackling this kind of lesion on the left side.  He is asymptomatic currently and has restoration of flow in the right side.  I do not feel compelled to recommend a left carotid endarterectomy to follow-up with us right carotid endarterectomy.  I will bring him back in 6 months for a follow-up with duplex.  The patient is agreeable.    On examination the patient is right neck wound has healed.  The sensation is returning.  Complains of some sensation in this throat like a wet piece of cardboard stuck down his throat.  I believe this is likely due to his intubation and this should resolve with time.  He also complains of altered taste sensation.  I do not understand this as this is not likely to have resulted from the operation itself.  He has reduced his smoking and reports that he is down to about 2 cigarettes a week.  This may have contributed to his altered taste.  Continues to bother him we might refer him to a neurologist.  He does recall if he has had COVID.  He denies any specific COVID-positive status.    Will see in 6 months with carotid duplex.  Total time with patient 25 minutes.

## 2024-06-11 NOTE — PROGRESS NOTES
Subjective   Patient ID: Dirk Gibson is a 63 y.o. male with past medical history of who presents for Follow-up.    Here today to follow up regarding cholesterol.  Has been taking rosuvastatin as prescribed.  Denies adverse effects.      Since his carotid surgery, patient states he has been having some abnormal taste sensation.  Occasionally will get heartburn or regurgitation.  Denies any sensation of aspiration.          Review of Systems   Constitutional:  Negative for chills, fatigue and fever.   HENT:  Negative for congestion, rhinorrhea, sinus pressure and sinus pain.    Respiratory:  Negative for cough, shortness of breath and wheezing.    Cardiovascular:  Negative for chest pain, palpitations and leg swelling.   Gastrointestinal:  Negative for constipation, diarrhea, nausea and vomiting.   Genitourinary:  Negative for dysuria, frequency and urgency.   Musculoskeletal:  Negative for arthralgias and myalgias.   Skin:  Negative for pallor, rash and wound.   Neurological:  Negative for dizziness and light-headedness.   Psychiatric/Behavioral:  Negative for dysphoric mood. The patient is not nervous/anxious.        Objective     Visit Vitals  /80   Pulse 74   Temp 36.9 °C (98.4 °F)   Ht 1.829 m (6')   Wt 110 kg (242 lb)   SpO2 99%   BMI 32.82 kg/m²   Smoking Status Former   BSA 2.36 m²         Physical Exam  Constitutional:       Appearance: Normal appearance.   HENT:      Head: Normocephalic and atraumatic.      Right Ear: External ear normal.      Left Ear: External ear normal.      Nose: Nose normal.      Mouth/Throat:      Mouth: Mucous membranes are moist.      Pharynx: Oropharynx is clear.   Eyes:      Conjunctiva/sclera: Conjunctivae normal.   Neck:      Vascular: Carotid bruit (right) present.   Cardiovascular:      Rate and Rhythm: Normal rate and regular rhythm.      Pulses: Normal pulses.      Heart sounds: Normal heart sounds.   Pulmonary:      Effort: Pulmonary effort is normal.      Breath  sounds: Normal breath sounds.   Abdominal:      General: There is no distension.      Palpations: Abdomen is soft.      Tenderness: There is no abdominal tenderness.   Skin:     General: Skin is warm and dry.   Neurological:      Mental Status: He is alert and oriented to person, place, and time.   Psychiatric:         Mood and Affect: Mood normal.         Behavior: Behavior normal.         Assessment/Plan   Problem List Items Addressed This Visit       Carotid artery stenosis without cerebral infarction, bilateral    Relevant Medications    rosuvastatin (Crestor) 40 mg tablet    Other Relevant Orders    Lipid Panel (Completed)    Hyperlipidemia - Primary    Relevant Medications    rosuvastatin (Crestor) 40 mg tablet    Other Relevant Orders    Lipid Panel (Completed)   Updated cholesterol, which shows good response to rosuvastatin.  Will continue same medication.  Continue scheduled follow up with cardiology.     Patient seen and discussed with attending physician, Dr Danie Garcia, DO PGY3  Family Medicine

## 2024-06-28 ENCOUNTER — APPOINTMENT (OUTPATIENT)
Dept: PRIMARY CARE | Facility: CLINIC | Age: 64
End: 2024-06-28
Payer: COMMERCIAL

## 2024-06-28 ENCOUNTER — HOSPITAL ENCOUNTER (OUTPATIENT)
Dept: RADIOLOGY | Facility: HOSPITAL | Age: 64
Discharge: HOME | End: 2024-06-28
Payer: COMMERCIAL

## 2024-06-28 VITALS
DIASTOLIC BLOOD PRESSURE: 78 MMHG | OXYGEN SATURATION: 97 % | WEIGHT: 242 LBS | HEART RATE: 74 BPM | SYSTOLIC BLOOD PRESSURE: 116 MMHG | BODY MASS INDEX: 32.78 KG/M2 | HEIGHT: 72 IN | TEMPERATURE: 98.7 F

## 2024-06-28 DIAGNOSIS — M25.562 CHRONIC PAIN OF LEFT KNEE: ICD-10-CM

## 2024-06-28 DIAGNOSIS — G89.29 CHRONIC PAIN OF LEFT KNEE: Primary | ICD-10-CM

## 2024-06-28 DIAGNOSIS — M25.562 CHRONIC PAIN OF LEFT KNEE: Primary | ICD-10-CM

## 2024-06-28 DIAGNOSIS — G89.29 CHRONIC PAIN OF LEFT KNEE: ICD-10-CM

## 2024-06-28 PROCEDURE — 3008F BODY MASS INDEX DOCD: CPT | Performed by: STUDENT IN AN ORGANIZED HEALTH CARE EDUCATION/TRAINING PROGRAM

## 2024-06-28 PROCEDURE — 99213 OFFICE O/P EST LOW 20 MIN: CPT | Performed by: STUDENT IN AN ORGANIZED HEALTH CARE EDUCATION/TRAINING PROGRAM

## 2024-06-28 PROCEDURE — 73564 X-RAY EXAM KNEE 4 OR MORE: CPT | Mod: LT

## 2024-06-28 ASSESSMENT — ENCOUNTER SYMPTOMS
SINUS PRESSURE: 0
LIGHT-HEADEDNESS: 0
DYSURIA: 0
CONSTIPATION: 0
PALPITATIONS: 0
RHINORRHEA: 0
NERVOUS/ANXIOUS: 0
ARTHRALGIAS: 1
FREQUENCY: 0
DIARRHEA: 0
WHEEZING: 0
SHORTNESS OF BREATH: 0
FEVER: 0
NAUSEA: 0
CHILLS: 0
FATIGUE: 0
DYSPHORIC MOOD: 0
COUGH: 0
MYALGIAS: 0
WOUND: 0
DIZZINESS: 0
VOMITING: 0
SINUS PAIN: 0

## 2024-06-28 NOTE — PROGRESS NOTES
Subjective   Patient ID: Dirk Gibson is a 63 y.o. male with past medical history of who presents for Knee Pain (Left knee pain).    Left knee pain.  Initially started about 3 years ago.  Comes and goes - most days not in pain.  However, in past month, it has been severe.   No initial injury.  Pain does not radiate. Seems to be worse with kneeling on the floor at work.  Denies weakness or instability.  Has significant pain with going from seated to standing.        Review of Systems   Constitutional:  Negative for chills, fatigue and fever.   HENT:  Negative for congestion, rhinorrhea, sinus pressure and sinus pain.    Respiratory:  Negative for cough, shortness of breath and wheezing.    Cardiovascular:  Negative for chest pain, palpitations and leg swelling.   Gastrointestinal:  Negative for constipation, diarrhea, nausea and vomiting.   Genitourinary:  Negative for dysuria, frequency and urgency.   Musculoskeletal:  Positive for arthralgias. Negative for myalgias.   Skin:  Negative for pallor, rash and wound.   Neurological:  Negative for dizziness and light-headedness.   Psychiatric/Behavioral:  Negative for dysphoric mood. The patient is not nervous/anxious.        Objective     Visit Vitals  /82   Pulse 74   Temp 37.1 °C (98.7 °F)   Ht 1.829 m (6')   Wt 110 kg (242 lb)   SpO2 97%   BMI 32.82 kg/m²   Smoking Status Former   BSA 2.36 m²         Physical Exam  Constitutional:       Appearance: Normal appearance.   HENT:      Head: Normocephalic and atraumatic.      Right Ear: External ear normal.      Left Ear: External ear normal.      Nose: Nose normal.      Mouth/Throat:      Mouth: Mucous membranes are moist.      Pharynx: Oropharynx is clear.   Eyes:      Conjunctiva/sclera: Conjunctivae normal.   Cardiovascular:      Rate and Rhythm: Normal rate and regular rhythm.      Pulses: Normal pulses.      Heart sounds: Normal heart sounds.   Pulmonary:      Effort: Pulmonary effort is normal.      Breath  sounds: Normal breath sounds.   Abdominal:      General: There is no distension.      Palpations: Abdomen is soft.      Tenderness: There is no abdominal tenderness.   Musculoskeletal:         General: No swelling, tenderness or deformity. Normal range of motion.   Skin:     General: Skin is warm and dry.   Neurological:      Mental Status: He is alert and oriented to person, place, and time.   Psychiatric:         Mood and Affect: Mood normal.         Behavior: Behavior normal.         Assessment/Plan   Problem List Items Addressed This Visit    None  Visit Diagnoses       Chronic pain of left knee    -  Primary    Relevant Orders    XR knee left 4+ views        Suspect patient's pain is likely related to OA.  Will obtain plain films to see what stage this is and determine what treatment is likely most appropriate.     Patient seen and discussed with attending physician, Dr Danie Garcia, DO PGY3  Family Medicine

## 2024-06-29 DIAGNOSIS — M25.469 EDEMA OF KNEE: Primary | ICD-10-CM

## 2024-12-09 ENCOUNTER — ANCILLARY PROCEDURE (OUTPATIENT)
Dept: VASCULAR MEDICINE | Facility: CLINIC | Age: 64
End: 2024-12-09
Payer: COMMERCIAL

## 2024-12-09 ENCOUNTER — OFFICE VISIT (OUTPATIENT)
Dept: VASCULAR SURGERY | Facility: CLINIC | Age: 64
End: 2024-12-09
Payer: COMMERCIAL

## 2024-12-09 VITALS
BODY MASS INDEX: 30.52 KG/M2 | WEIGHT: 225 LBS | SYSTOLIC BLOOD PRESSURE: 151 MMHG | DIASTOLIC BLOOD PRESSURE: 81 MMHG | HEART RATE: 58 BPM

## 2024-12-09 DIAGNOSIS — I65.23 BILATERAL CAROTID ARTERY STENOSIS: Primary | ICD-10-CM

## 2024-12-09 DIAGNOSIS — I65.23 BILATERAL CAROTID ARTERY STENOSIS: ICD-10-CM

## 2024-12-09 PROCEDURE — 99215 OFFICE O/P EST HI 40 MIN: CPT | Performed by: SURGERY

## 2024-12-09 PROCEDURE — 99215 OFFICE O/P EST HI 40 MIN: CPT | Mod: 25 | Performed by: SURGERY

## 2024-12-09 PROCEDURE — 93880 EXTRACRANIAL BILAT STUDY: CPT | Performed by: SURGERY

## 2024-12-09 PROCEDURE — 93880 EXTRACRANIAL BILAT STUDY: CPT

## 2024-12-09 ASSESSMENT — PATIENT HEALTH QUESTIONNAIRE - PHQ9
1. LITTLE INTEREST OR PLEASURE IN DOING THINGS: NOT AT ALL
SUM OF ALL RESPONSES TO PHQ9 QUESTIONS 1 AND 2: 0
2. FEELING DOWN, DEPRESSED OR HOPELESS: NOT AT ALL

## 2024-12-09 ASSESSMENT — LIFESTYLE VARIABLES
AUDIT-C TOTAL SCORE: 2
HOW OFTEN DO YOU HAVE SIX OR MORE DRINKS ON ONE OCCASION: LESS THAN MONTHLY
HOW OFTEN DO YOU HAVE A DRINK CONTAINING ALCOHOL: MONTHLY OR LESS
HOW MANY STANDARD DRINKS CONTAINING ALCOHOL DO YOU HAVE ON A TYPICAL DAY: 1 OR 2
SKIP TO QUESTIONS 9-10: 0

## 2024-12-09 ASSESSMENT — ENCOUNTER SYMPTOMS
DEPRESSION: 0
LOSS OF SENSATION IN FEET: 0
OCCASIONAL FEELINGS OF UNSTEADINESS: 0

## 2024-12-09 NOTE — PROGRESS NOTES
Primary Care Physician: Sher Helton MD  Primary Cardiologist:       Date of Visit: 12/09/2024 10:00 AM EST  Location of visit: [facility]   Type of Visit: Follow up             Chief Complaint   Patient presents with    Follow-up     Follow up       HPI / Summary:   Dirk Gibson is a 63 y.o. male  with    who returns for routine follow up   Patient returns for follow-up of his carotid atherosclerotic disease.  He had a right carotid endarterectomy by me in March and he recovered well from that.  He still has some paresthesia from the surgical incision which is normal.  We discussed strategies to recover that sensation.  He has a string sign on the left internal carotid artery which well surgically accessible offers an increased risk of stroke with operations.  He has remained asymptomatic on that side and with the added flow from the right side should be safe from strokes on a hemodynamic basis.  He did ask a saline question regarding if the lesion should ever open back up again.  I do not think this is the case as he it is a chronic near occlusion.  And even when it occludes he should not offer any stroke risk.    I will bring him back in 6 months for recheck with his carotid duplex.    12 system review is negative except as noted above        Medical History:   Past Medical History:   Diagnosis Date    Hyperlipidemia     Lung nodule        Social History:   Tobacco Use: Medium Risk (12/9/2024)    Patient History     Smoking Tobacco Use: Former     Smokeless Tobacco Use: Former     Passive Exposure: Not on file         MEDICATIONS:   Current Outpatient Medications   Medication Instructions    aspirin 81 mg, oral, Daily    rosuvastatin (CRESTOR) 40 mg, oral, Daily         IMAGING REVIEWED:   Echocardiogram: No results found for this or any previous visit from the past 1825 days.    Stress Testing: No results found for this or any previous visit from the past 1825 days.    Cardiac Catheterization: No  "results found for this or any previous visit from the past 1825 days.    Cardiac Scoring: No results found for this or any previous visit from the past 1825 days.    AAA : No results found for this or any previous visit from the past 1825 days.    OTHER: No results found for this or any previous visit from the past 1825 days.          LABS:  CBC with Differential:    Lab Results   Component Value Date    WBC 5.3 06/07/2024    RBC 4.38 (L) 06/07/2024    HGB 13.5 06/07/2024    HCT 42.4 06/07/2024     (L) 06/07/2024    MCV 97 06/07/2024    MCH 30.8 06/07/2024    MCHC 31.8 (L) 06/07/2024    RDW 12.7 06/07/2024    NRBC 0.0 06/07/2024    LYMPHOPCT 31.9 01/30/2024    MONOPCT 9.9 01/30/2024    EOSPCT 6.7 01/30/2024    BASOPCT 0.6 01/30/2024    MONOSABS 0.69 01/30/2024    LYMPHSABS 2.22 01/30/2024    EOSABS 0.47 01/30/2024    BASOSABS 0.04 01/30/2024     CMP:    Lab Results   Component Value Date     06/07/2024    K 4.4 06/07/2024     06/07/2024    CO2 32 06/07/2024    BUN 23 06/07/2024    CREATININE 0.82 06/07/2024    GLUCOSE 86 06/07/2024    PROT 5.9 (L) 06/07/2024    CALCIUM 8.4 (L) 06/07/2024    BILITOT 1.2 06/07/2024    ALKPHOS 84 06/07/2024    AST 27 06/07/2024    ALT 31 06/07/2024     BMP:    Lab Results   Component Value Date     06/07/2024    K 4.4 06/07/2024     06/07/2024    CO2 32 06/07/2024    BUN 23 06/07/2024    CREATININE 0.82 06/07/2024    CALCIUM 8.4 (L) 06/07/2024    GLUCOSE 86 06/07/2024     Magnesium:  Lab Results   Component Value Date    MG 2.20 03/03/2024     Troponin:  No results found for: \"TROPHS\"  BNP: No results found for: \"BNP\"      Lipid Panel:  Lab Results   Component Value Date    HDL 38.4 06/07/2024    CHHDL 3.4 06/07/2024    VLDL 27 06/07/2024    TRIG 136 06/07/2024    NHDL 94 06/07/2024        Lab work and imaging results independently reviewed by me         Vascular Physical Exam  Constitutional:       Appearance: Normal appearance.   HENT:      Head: " Normocephalic and atraumatic.   Eyes:      Extraocular Movements: Extraocular movements intact.      Pupils: Pupils are equal, round, and reactive to light.   Cardiovascular:      Rate and Rhythm: Normal rate.   Pulmonary:      Effort: Pulmonary effort is normal.   Abdominal:      Palpations: Abdomen is soft.   Musculoskeletal:         General: Normal range of motion.      Neck: Normal range of motion.   Skin:     General: Skin is warm.   Neurological:      General: No focal deficit present.      Mental Status: He is alert and oriented to person, place, and time.   Psychiatric:         Mood and Affect: Mood normal.           Diagnoses and all orders for this visit:  Bilateral carotid artery stenosis  -     Vascular US Carotid Artery Duplex Bilateral; Future            Joe Umaña MD       Orders:  No orders of the defined types were placed in this encounter.        Followup Appts:  Future Appointments   Date Time Provider Department Center   4/21/2025  4:15 PM Judith Guillory MD [address]

## 2025-01-27 ENCOUNTER — TELEPHONE (OUTPATIENT)
Dept: PRIMARY CARE | Facility: CLINIC | Age: 65
End: 2025-01-27
Payer: COMMERCIAL

## 2025-01-27 DIAGNOSIS — E78.2 MIXED HYPERLIPIDEMIA: ICD-10-CM

## 2025-01-27 DIAGNOSIS — I65.23 CAROTID ARTERY STENOSIS WITHOUT CEREBRAL INFARCTION, BILATERAL: ICD-10-CM

## 2025-01-27 RX ORDER — ROSUVASTATIN CALCIUM 40 MG/1
40 TABLET, COATED ORAL DAILY
Qty: 30 TABLET | Refills: 1 | Status: SHIPPED | OUTPATIENT
Start: 2025-01-27 | End: 2025-03-28

## 2025-01-27 NOTE — TELEPHONE ENCOUNTER
Patient has a appt to establish care was a keegan patient needs refill of medication.   Rosuvastatin 40 mg

## 2025-03-24 ENCOUNTER — APPOINTMENT (OUTPATIENT)
Dept: PRIMARY CARE | Facility: CLINIC | Age: 65
End: 2025-03-24
Payer: COMMERCIAL

## 2025-03-24 VITALS
DIASTOLIC BLOOD PRESSURE: 74 MMHG | SYSTOLIC BLOOD PRESSURE: 126 MMHG | HEIGHT: 73 IN | HEART RATE: 65 BPM | BODY MASS INDEX: 31.28 KG/M2 | OXYGEN SATURATION: 97 % | WEIGHT: 236 LBS

## 2025-03-24 DIAGNOSIS — L28.2 PRURITIC RASH: ICD-10-CM

## 2025-03-24 DIAGNOSIS — E78.2 MIXED HYPERLIPIDEMIA: ICD-10-CM

## 2025-03-24 DIAGNOSIS — Z13.0 SCREENING FOR BLOOD DISEASE: ICD-10-CM

## 2025-03-24 DIAGNOSIS — Z13.6 SCREENING FOR CARDIOVASCULAR CONDITION: ICD-10-CM

## 2025-03-24 DIAGNOSIS — Z12.5 PROSTATE CANCER SCREENING: ICD-10-CM

## 2025-03-24 DIAGNOSIS — Z13.29 SCREENING FOR THYROID DISORDER: ICD-10-CM

## 2025-03-24 DIAGNOSIS — I65.23 CAROTID ARTERY STENOSIS WITHOUT CEREBRAL INFARCTION, BILATERAL: ICD-10-CM

## 2025-03-24 DIAGNOSIS — Z00.00 ANNUAL PHYSICAL EXAM: Primary | ICD-10-CM

## 2025-03-24 PROCEDURE — 99396 PREV VISIT EST AGE 40-64: CPT

## 2025-03-24 PROCEDURE — 3008F BODY MASS INDEX DOCD: CPT

## 2025-03-24 PROCEDURE — 99214 OFFICE O/P EST MOD 30 MIN: CPT

## 2025-03-24 RX ORDER — HYDROCORTISONE 25 MG/G
CREAM TOPICAL 2 TIMES DAILY
Qty: 28 G | Refills: 1 | Status: SHIPPED | OUTPATIENT
Start: 2025-03-24 | End: 2025-03-24 | Stop reason: WASHOUT

## 2025-03-24 RX ORDER — ASPIRIN 81 MG/1
81 TABLET ORAL DAILY
COMMUNITY
End: 2025-03-27 | Stop reason: SDUPTHER

## 2025-03-24 RX ORDER — HYDROCORTISONE 25 MG/G
CREAM TOPICAL 2 TIMES DAILY
Qty: 453.6 G | Refills: 1 | Status: SHIPPED | OUTPATIENT
Start: 2025-03-24

## 2025-03-24 RX ORDER — ROSUVASTATIN CALCIUM 40 MG/1
40 TABLET, COATED ORAL DAILY
Qty: 90 TABLET | Refills: 1 | Status: SHIPPED | OUTPATIENT
Start: 2025-03-24 | End: 2025-09-20

## 2025-03-24 NOTE — PROGRESS NOTES
"Chief Complaint   Patient presents with    [facility] Care    Annual Exam     Subjective     Health Maintenance   Colonoscopy never  Tdap 2019, Shingles eligible   50 pack year history tobacco use     HLD, carotid artery stenosis  - crestor 40mg  - endarterectomy 2024    Lung Nodules  - identified on low dose CT 1/2024  - 8 x 7 mm right lower lobe nodule   - 3 mm left apical nodular density     Rash  - located on left side neck and posterior scalp  - Duration: Several months  - Associated symptoms: Itching, occasional bleeding on pillow  - Previous episodes: History of occasional single sore on face/jawline for years  - denies associated pain  - denies bedbugs, insects in the home  - pt has tried OTC antibacterial and antifungal creams; mild improvement was noted with antifungal cream but did not last    Tobacco Use  - states he quit for 6 months after his endarterectomy and started again  - he now smokes only 1/2 PPD, whereas he used to smoke 1ppd or more  - he recently purchased nicotine gum which is helpful and he plans to quit within the next several months    Objective   /74   Pulse 65   Ht 1.854 m (6' 1\")   Wt 107 kg (236 lb)   SpO2 97%   BMI 31.14 kg/m²     Physical Exam  Vitals reviewed.   Constitutional:       Appearance: Normal appearance.   HENT:      Head: Normocephalic and atraumatic.   Eyes:      Extraocular Movements: Extraocular movements intact.      Pupils: Pupils are equal, round, and reactive to light.   Cardiovascular:      Rate and Rhythm: Normal rate and regular rhythm.      Heart sounds: Normal heart sounds.   Pulmonary:      Effort: Pulmonary effort is normal. No respiratory distress.      Breath sounds: Normal breath sounds.   Musculoskeletal:      Right lower leg: No edema.      Left lower leg: No edema.   Skin:     Comments:   Multiple erythematous papules scattered across the left lateral neck with excoriations and superficial crusting.  The surrounding skin shows mild erythema " without significant induration or fluctuance. No purulent drainage is observed. The lesions do not follow a clear dermatomal distribution.    Neurological:      Mental Status: He is alert and oriented to person, place, and time.   Psychiatric:         Mood and Affect: Mood normal.         Behavior: Behavior normal.           Assessment/Plan     Yearly Adult Physical  Declines colon cancer screening  Declines repeat low dose CT scan for known lung nodules  Declines Shingrix, Prevnar  Screening labs ordered     2. Pruritic Rash  - Etiology unclear: possible contact/mechanical dermatitis, fungal infection  - due to pruritic nature of the rash with excoriations, will treat with steroidal cream  - if symptoms do not improve or worsen, will plan for treatment with clotrimazole or nystatin cream and/or powder for suspected fungal infection  - Treatment: Prescribed hydrocortisone 2.5% cream for application to affected areas  - Plan: If symptoms worsen or persist, consider antifungal treatment or dermatology referral    3. Smoking cessation  - Assessment: Current smoker, approximately half pack per day  - Treatment: Discussed nicotine replacement options  - Plan: Encouraged continued efforts to quit smoking    4. Lung nodules  - Assessment: History of lung nodules on previous CT scan  - Plan: Recommended repeat low-dose CT scan for surveillance, patient hesitant due to financial concerns    5. Hyperlipidemia  - Treatment: Continue Rosuvastatin 40mg  - refills ordered    Pb Hilton DO  PGY-2 Family Medicine

## 2025-03-27 DIAGNOSIS — I65.23 CAROTID ARTERY STENOSIS WITHOUT CEREBRAL INFARCTION, BILATERAL: Primary | ICD-10-CM

## 2025-03-28 RX ORDER — ASPIRIN 81 MG/1
81 TABLET ORAL DAILY
Qty: 90 TABLET | Refills: 3 | Status: SHIPPED | OUTPATIENT
Start: 2025-03-28 | End: 2026-03-23

## 2025-04-15 LAB
ALBUMIN SERPL-MCNC: 4 G/DL (ref 3.6–5.1)
ALP SERPL-CCNC: 71 U/L (ref 35–144)
ALT SERPL-CCNC: 21 U/L (ref 9–46)
ANION GAP SERPL CALCULATED.4IONS-SCNC: 4 MMOL/L (CALC) (ref 7–17)
AST SERPL-CCNC: 23 U/L (ref 10–35)
BASOPHILS # BLD AUTO: 30 CELLS/UL (ref 0–200)
BASOPHILS NFR BLD AUTO: 0.4 %
BILIRUB SERPL-MCNC: 1.8 MG/DL (ref 0.2–1.2)
BUN SERPL-MCNC: 18 MG/DL (ref 7–25)
CALCIUM SERPL-MCNC: 8.9 MG/DL (ref 8.6–10.3)
CHLORIDE SERPL-SCNC: 105 MMOL/L (ref 98–110)
CHOLEST SERPL-MCNC: 140 MG/DL
CHOLEST/HDLC SERPL: 3.4 (CALC)
CO2 SERPL-SCNC: 32 MMOL/L (ref 20–32)
CREAT SERPL-MCNC: 0.81 MG/DL (ref 0.7–1.35)
EGFRCR SERPLBLD CKD-EPI 2021: 99 ML/MIN/1.73M2
EOSINOPHIL # BLD AUTO: 450 CELLS/UL (ref 15–500)
EOSINOPHIL NFR BLD AUTO: 6 %
ERYTHROCYTE [DISTWIDTH] IN BLOOD BY AUTOMATED COUNT: 12.4 % (ref 11–15)
GLUCOSE SERPL-MCNC: 110 MG/DL (ref 65–99)
HCT VFR BLD AUTO: 42.4 % (ref 38.5–50)
HDLC SERPL-MCNC: 41 MG/DL
HGB BLD-MCNC: 14.2 G/DL (ref 13.2–17.1)
LDLC SERPL CALC-MCNC: 81 MG/DL (CALC)
LYMPHOCYTES # BLD AUTO: 1260 CELLS/UL (ref 850–3900)
LYMPHOCYTES NFR BLD AUTO: 16.8 %
MCH RBC QN AUTO: 31.8 PG (ref 27–33)
MCHC RBC AUTO-ENTMCNC: 33.5 G/DL (ref 32–36)
MCV RBC AUTO: 94.9 FL (ref 80–100)
MONOCYTES # BLD AUTO: 780 CELLS/UL (ref 200–950)
MONOCYTES NFR BLD AUTO: 10.4 %
NEUTROPHILS # BLD AUTO: 4980 CELLS/UL (ref 1500–7800)
NEUTROPHILS NFR BLD AUTO: 66.4 %
NONHDLC SERPL-MCNC: 99 MG/DL (CALC)
PLATELET # BLD AUTO: 192 THOUSAND/UL (ref 140–400)
PMV BLD REES-ECKER: 10.6 FL (ref 7.5–12.5)
POTASSIUM SERPL-SCNC: 4.6 MMOL/L (ref 3.5–5.3)
PROT SERPL-MCNC: 6.1 G/DL (ref 6.1–8.1)
PSA SERPL-MCNC: 0.77 NG/ML
RBC # BLD AUTO: 4.47 MILLION/UL (ref 4.2–5.8)
SODIUM SERPL-SCNC: 141 MMOL/L (ref 135–146)
TRIGL SERPL-MCNC: 98 MG/DL
TSH SERPL-ACNC: 1.01 MIU/L (ref 0.4–4.5)
WBC # BLD AUTO: 7.5 THOUSAND/UL (ref 3.8–10.8)

## 2025-04-21 ENCOUNTER — APPOINTMENT (OUTPATIENT)
Dept: PRIMARY CARE | Facility: CLINIC | Age: 65
End: 2025-04-21
Payer: COMMERCIAL

## 2025-04-21 VITALS
SYSTOLIC BLOOD PRESSURE: 126 MMHG | HEART RATE: 60 BPM | OXYGEN SATURATION: 97 % | WEIGHT: 235 LBS | BODY MASS INDEX: 31 KG/M2 | DIASTOLIC BLOOD PRESSURE: 68 MMHG

## 2025-04-21 DIAGNOSIS — E66.811 CLASS 1 OBESITY DUE TO EXCESS CALORIES WITH SERIOUS COMORBIDITY AND BODY MASS INDEX (BMI) OF 31.0 TO 31.9 IN ADULT: ICD-10-CM

## 2025-04-21 DIAGNOSIS — I65.23 CAROTID ARTERY STENOSIS WITHOUT CEREBRAL INFARCTION, BILATERAL: ICD-10-CM

## 2025-04-21 DIAGNOSIS — R91.1 LUNG NODULE: ICD-10-CM

## 2025-04-21 DIAGNOSIS — E66.09 CLASS 1 OBESITY DUE TO EXCESS CALORIES WITH SERIOUS COMORBIDITY AND BODY MASS INDEX (BMI) OF 31.0 TO 31.9 IN ADULT: ICD-10-CM

## 2025-04-21 DIAGNOSIS — Z23 ENCOUNTER FOR IMMUNIZATION: ICD-10-CM

## 2025-04-21 DIAGNOSIS — R73.03 PREDIABETES: ICD-10-CM

## 2025-04-21 DIAGNOSIS — Z12.5 PROSTATE CANCER SCREENING: ICD-10-CM

## 2025-04-21 DIAGNOSIS — Z12.11 COLON CANCER SCREENING: ICD-10-CM

## 2025-04-21 DIAGNOSIS — F17.210 CIGARETTE NICOTINE DEPENDENCE WITHOUT COMPLICATION: ICD-10-CM

## 2025-04-21 DIAGNOSIS — E78.2 MIXED HYPERLIPIDEMIA: Primary | ICD-10-CM

## 2025-04-21 PROBLEM — Z80.0 FAMILY HISTORY OF COLON CANCER IN MOTHER: Status: ACTIVE | Noted: 2025-04-21

## 2025-04-21 PROBLEM — Z86.73 HISTORY OF TIA (TRANSIENT ISCHEMIC ATTACK): Status: ACTIVE | Noted: 2025-04-21

## 2025-04-21 LAB — POC HEMOGLOBIN A1C: 5.9 % (ref 4.2–6.5)

## 2025-04-21 PROCEDURE — 83036 HEMOGLOBIN GLYCOSYLATED A1C: CPT | Performed by: FAMILY MEDICINE

## 2025-04-21 PROCEDURE — 4004F PT TOBACCO SCREEN RCVD TLK: CPT | Performed by: FAMILY MEDICINE

## 2025-04-21 PROCEDURE — 99214 OFFICE O/P EST MOD 30 MIN: CPT | Performed by: FAMILY MEDICINE

## 2025-04-21 ASSESSMENT — ENCOUNTER SYMPTOMS
SINUS PAIN: 0
CHILLS: 0
FEVER: 0
SORE THROAT: 0
NAUSEA: 0
FREQUENCY: 0
CONSTIPATION: 0
POLYPHAGIA: 0
DIAPHORESIS: 0
CONFUSION: 0
COUGH: 0
ADENOPATHY: 0
CHEST TIGHTNESS: 0
HEMATURIA: 0
SINUS PRESSURE: 0
NUMBNESS: 0
DIZZINESS: 0
SHORTNESS OF BREATH: 0
NERVOUS/ANXIOUS: 0
DIARRHEA: 0
VOMITING: 0
DYSPHORIC MOOD: 0
POLYDIPSIA: 0
UNEXPECTED WEIGHT CHANGE: 0
DYSURIA: 0
ABDOMINAL PAIN: 0
WHEEZING: 0
HEADACHES: 0
LIGHT-HEADEDNESS: 0
PALPITATIONS: 0

## 2025-04-21 NOTE — PROGRESS NOTES
Subjective   Patient ID: Dirk Gibson is a 64 y.o. male who presents for Establish Care.    HPI   64 y.o. male here to establish care. Past medical history, past surgical history, medications, allergies, family history, and social history reviewed with patient and updated in chart.     Reviewed labs from 4/14/25. Results as below.     Office Visit on 04/21/2025   Component Date Value Ref Range Status    POC HEMOGLOBIN A1c 04/21/2025 5.9  4.2 - 6.5 % Final   Office Visit on 03/24/2025   Component Date Value Ref Range Status    PSA, TOTAL 04/14/2025 0.77  < OR = 4.00 ng/mL Final    Comment: The total PSA value from this assay system is   standardized against the WHO standard. The test   result will be approximately 20% lower when compared   to the equimolar-standardized total PSA (Brandon   Weinert). Comparison of serial PSA results should be   interpreted with this fact in mind.     This test was performed using the Siemens   chemiluminescent method. Values obtained from   different assay methods cannot be used  interchangeably. PSA levels, regardless of  value, should not be interpreted as absolute  evidence of the presence or absence of disease.      CHOLESTEROL, TOTAL 04/14/2025 140  <200 mg/dL Final    HDL CHOLESTEROL 04/14/2025 41  > OR = 40 mg/dL Final    TRIGLYCERIDES 04/14/2025 98  <150 mg/dL Final    LDL-CHOLESTEROL 04/14/2025 81  mg/dL (calc) Final    Comment: Reference range: <100     Desirable range <100 mg/dL for primary prevention;    <70 mg/dL for patients with CHD or diabetic patients   with > or = 2 CHD risk factors.     LDL-C is now calculated using the Espinoza   calculation, which is a validated novel method providing   better accuracy than the Friedewald equation in the   estimation of LDL-C.   Shashank FRANK et al. ANUSHA. 2013;310(19): 2621-9933   (http://education.[employer].com/faq/VTX292)      CHOL/HDLC RATIO 04/14/2025 3.4  <5.0 (calc) Final    NON HDL CHOLESTEROL 04/14/2025 99  <130  mg/dL (calc) Final    Comment: For patients with diabetes plus 1 major ASCVD risk   factor, treating to a non-HDL-C goal of <100 mg/dL   (LDL-C of <70 mg/dL) is considered a therapeutic   option.      TSH W/REFLEX TO FT4 04/14/2025 1.01  0.40 - 4.50 mIU/L Final    GLUCOSE 04/14/2025 110 (H)  65 - 99 mg/dL Final    Comment:               Fasting reference interval     For someone without known diabetes, a glucose value  between 100 and 125 mg/dL is consistent with  prediabetes and should be confirmed with a  follow-up test.         UREA NITROGEN (BUN) 04/14/2025 18  7 - 25 mg/dL Final    CREATININE 04/14/2025 0.81  0.70 - 1.35 mg/dL Final    EGFR 04/14/2025 99  > OR = 60 mL/min/1.73m2 Final    SODIUM 04/14/2025 141  135 - 146 mmol/L Final    POTASSIUM 04/14/2025 4.6  3.5 - 5.3 mmol/L Final    CHLORIDE 04/14/2025 105  98 - 110 mmol/L Final    CARBON DIOXIDE 04/14/2025 32  20 - 32 mmol/L Final    ELECTROLYTE BALANCE 04/14/2025 4 (L)  7 - 17 mmol/L (calc) Final    CALCIUM 04/14/2025 8.9  8.6 - 10.3 mg/dL Final    PROTEIN, TOTAL 04/14/2025 6.1  6.1 - 8.1 g/dL Final    ALBUMIN 04/14/2025 4.0  3.6 - 5.1 g/dL Final    BILIRUBIN, TOTAL 04/14/2025 1.8 (H)  0.2 - 1.2 mg/dL Final    ALKALINE PHOSPHATASE 04/14/2025 71  35 - 144 U/L Final    AST 04/14/2025 23  10 - 35 U/L Final    ALT 04/14/2025 21  9 - 46 U/L Final    WHITE BLOOD CELL COUNT 04/14/2025 7.5  3.8 - 10.8 Thousand/uL Final    RED BLOOD CELL COUNT 04/14/2025 4.47  4.20 - 5.80 Million/uL Final    HEMOGLOBIN 04/14/2025 14.2  13.2 - 17.1 g/dL Final    HEMATOCRIT 04/14/2025 42.4  38.5 - 50.0 % Final    MCV 04/14/2025 94.9  80.0 - 100.0 fL Final    MCH 04/14/2025 31.8  27.0 - 33.0 pg Final    MCHC 04/14/2025 33.5  32.0 - 36.0 g/dL Final    Comment: For adults, a slight decrease in the calculated MCHC  value (in the range of 30 to 32 g/dL) is most likely  not clinically significant; however, it should be  interpreted with caution in correlation with other  red cell  parameters and the patient's clinical  condition.      RDW 04/14/2025 12.4  11.0 - 15.0 % Final    PLATELET COUNT 04/14/2025 192  140 - 400 Thousand/uL Final    MPV 04/14/2025 10.6  7.5 - 12.5 fL Final    ABSOLUTE NEUTROPHILS 04/14/2025 4,980  1,500 - 7,800 cells/uL Final    ABSOLUTE LYMPHOCYTES 04/14/2025 1,260  850 - 3,900 cells/uL Final    ABSOLUTE MONOCYTES 04/14/2025 780  200 - 950 cells/uL Final    ABSOLUTE EOSINOPHILS 04/14/2025 450  15 - 500 cells/uL Final    ABSOLUTE BASOPHILS 04/14/2025 30  0 - 200 cells/uL Final    NEUTROPHILS 04/14/2025 66.4  % Final    LYMPHOCYTES 04/14/2025 16.8  % Final    MONOCYTES 04/14/2025 10.4  % Final    EOSINOPHILS 04/14/2025 6.0  % Final    BASOPHILS 04/14/2025 0.4  % Final        Review of Systems   Constitutional:  Negative for chills, diaphoresis, fever and unexpected weight change.   HENT:  Negative for congestion, sinus pressure, sinus pain, sneezing and sore throat.    Respiratory:  Negative for cough, chest tightness, shortness of breath and wheezing.    Cardiovascular:  Negative for chest pain, palpitations and leg swelling.   Gastrointestinal:  Negative for abdominal pain, constipation, diarrhea, nausea and vomiting.   Endocrine: Negative for cold intolerance, heat intolerance, polydipsia, polyphagia and polyuria.   Genitourinary:  Negative for dysuria, frequency, hematuria and urgency.   Neurological:  Negative for dizziness, syncope, light-headedness, numbness and headaches.   Hematological:  Negative for adenopathy.   Psychiatric/Behavioral:  Negative for confusion and dysphoric mood. The patient is not nervous/anxious.        Objective   /68 (BP Location: Left arm, Patient Position: Sitting, BP Cuff Size: Adult)   Pulse 60   Wt 107 kg (235 lb)   SpO2 97%   BMI 31.00 kg/m²     Physical Exam  Vitals and nursing note reviewed.   Constitutional:       General: He is not in acute distress.     Appearance: Normal appearance.   HENT:      Head: Normocephalic  and atraumatic.      Nose: Nose normal.   Eyes:      Extraocular Movements: Extraocular movements intact.      Conjunctiva/sclera: Conjunctivae normal.      Pupils: Pupils are equal, round, and reactive to light.   Cardiovascular:      Rate and Rhythm: Normal rate and regular rhythm.      Heart sounds: No murmur heard.     No friction rub. No gallop.   Pulmonary:      Effort: Pulmonary effort is normal.      Breath sounds: Normal breath sounds. No wheezing, rhonchi or rales.   Abdominal:      General: Bowel sounds are normal. There is no distension.      Palpations: Abdomen is soft.      Tenderness: There is no abdominal tenderness.   Musculoskeletal:         General: Normal range of motion.      Cervical back: Normal range of motion and neck supple.   Skin:     General: Skin is warm and dry.   Neurological:      General: No focal deficit present.      Mental Status: He is alert and oriented to person, place, and time.      Deep Tendon Reflexes: Reflexes normal.   Psychiatric:         Mood and Affect: Mood normal.         Behavior: Behavior normal.         Thought Content: Thought content normal.         Judgment: Judgment normal.         Assessment/Plan   Problem List Items Addressed This Visit           ICD-10-CM    BMI 31.0-31.9,adult Z68.31    - Encouraged healthy lifestyle, including adequate exercise and high fiber, low fat and low carb diet.          Carotid artery stenosis without cerebral infarction, bilateral I65.23    - continue aspirin, statin  - follows with vascular  - had right carotid endarterectomy in March 2024  - has 70% stenosis in left carotid         Cigarette nicotine dependence without complication F17.210    - has been working on cutting back. Now down to 0.5 pack per day   - discussed LDCT. He declines at this time          Class 1 obesity due to excess calories with serious comorbidity and body mass index (BMI) of 31.0 to 31.9 in adult E66.811, E66.09, Z68.31    - Encouraged healthy  lifestyle, including adequate exercise and high fiber, low fat and low carb diet.          Colon cancer screening Z12.11    - family history of colon cancer in his mother  - discussed colonoscopy. Declines at this time          Encounter for immunization Z23    - declines PCV 20  - recommended the following vaccines at the pharmacy: Shingrix, COVID-19         Lung nodule R91.1    - 1/2024 LDCT -->  8 x 7 mm right lower lobe nodule. Recommend further assessment with short-term follow-up chest CT in 3 months versus PET-CT.  - he never had the repeat CT scan.  Discussed with him today. He declines. He will think about doing it again in 3 months.   - explained that risk (undiagnosed cancer) of leaving this undone         Mixed hyperlipidemia - Primary E78.2    - LDL 81  - continue rosuvastatin         Prediabetes R73.03    - HbA1c 5.8 one year ago. Will rechecknow  - Encouraged healthy lifestyle, including adequate exercise and high fiber, low fat and low carb diet.          Relevant Orders    POCT glycosylated hemoglobin (Hb A1C) manually resulted (Completed)    Prostate cancer screening Z12.5    - normal PSA 4/2025; repeat in 1 year

## 2025-04-21 NOTE — ASSESSMENT & PLAN NOTE
- HbA1c 5.8 one year ago. Will rechecknow  - Encouraged healthy lifestyle, including adequate exercise and high fiber, low fat and low carb diet.    28-Dec-2022

## 2025-04-21 NOTE — ASSESSMENT & PLAN NOTE
- has been working on cutting back. Now down to 0.5 pack per day   - discussed LDCT. He declines at this time

## 2025-04-21 NOTE — ASSESSMENT & PLAN NOTE
- continue aspirin, statin  - follows with vascular  - had right carotid endarterectomy in March 2024  - has 70% stenosis in left carotid

## 2025-04-21 NOTE — ASSESSMENT & PLAN NOTE
- 1/2024 LDCT -->  8 x 7 mm right lower lobe nodule. Recommend further assessment with short-term follow-up chest CT in 3 months versus PET-CT.  - he never had the repeat CT scan.  Discussed with him today. He declines. He will think about doing it again in 3 months.   - explained that risk (undiagnosed cancer) of leaving this undone

## 2025-04-21 NOTE — PATIENT INSTRUCTIONS
Dirk Gibson ,    Thank you for coming in today. We at [facility] appreciate your trust in our care. If you have any questions or concerns about the care you received today, please do not hesitate to contact us at [phone].    The following instructions were discussed today:    - Follow up in 6 months

## 2025-05-17 ENCOUNTER — HOSPITAL ENCOUNTER (EMERGENCY)
Age: 65
Discharge: HOME | End: 2025-05-17
Payer: COMMERCIAL

## 2025-05-17 VITALS
SYSTOLIC BLOOD PRESSURE: 121 MMHG | WEIGHT: 235 LBS | RESPIRATION RATE: 18 BRPM | TEMPERATURE: 98.4 F | OXYGEN SATURATION: 99 % | HEART RATE: 56 BPM | DIASTOLIC BLOOD PRESSURE: 70 MMHG

## 2025-05-17 DIAGNOSIS — M25.561 ACUTE PAIN OF RIGHT KNEE: Primary | ICD-10-CM

## 2025-05-17 PROCEDURE — 99211 OFF/OP EST MAY X REQ PHY/QHP: CPT

## 2025-05-17 RX ORDER — ASPIRIN 81 MG/1
81 TABLET ORAL DAILY
COMMUNITY

## 2025-05-17 RX ORDER — METHYLPREDNISOLONE 4 MG/1
TABLET ORAL
Qty: 1 KIT | Refills: 1 | Status: SHIPPED | OUTPATIENT
Start: 2025-05-17 | End: 2025-05-23

## 2025-05-17 ASSESSMENT — PAIN - FUNCTIONAL ASSESSMENT
PAIN_FUNCTIONAL_ASSESSMENT: NONE - DENIES PAIN
PAIN_FUNCTIONAL_ASSESSMENT: 0-10

## 2025-05-17 ASSESSMENT — PAIN DESCRIPTION - ORIENTATION: ORIENTATION: RIGHT

## 2025-05-17 ASSESSMENT — PAIN DESCRIPTION - LOCATION: LOCATION: KNEE

## 2025-05-17 ASSESSMENT — PAIN DESCRIPTION - PAIN TYPE: TYPE: CHRONIC PAIN

## 2025-05-17 ASSESSMENT — PAIN SCALES - GENERAL: PAINLEVEL_OUTOF10: 6

## 2025-05-17 ASSESSMENT — PAIN DESCRIPTION - DESCRIPTORS: DESCRIPTORS: ACHING;SORE;DISCOMFORT

## 2025-05-17 NOTE — ED PROVIDER NOTES
Independent [facility] Visit.        [facility]  ED  Encounter Note  Admit Date/RoomTime: 2025  9:27 AM  ED Room:   NAME: Rigo Marinelli  : 1960  MRN: [id number]  PCP: Crystal Villaseñor MD    CHIEF COMPLAINT     Knee Pain (Right knee pain, arthritis, no injury, started a few days ago)    HISTORY OF PRESENT ILLNESS        Rigo Marinelli is a 64 y.o. male who presents to the ED with complaints of right knee pain that has slowly been developing over the past several days.  Patient states history of arthritis.  Patient states knee pain occurred in the past and was prescribed a Medrol Dosepak which alleviated the pain.  Patient denies fever or chills.  Patient denies any recent injury.  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  Patient states that no redness, swelling, or deformity of the right knee.  Patient states has been taking Motrin and Tylenol without relief.    REVIEW OF SYSTEMS     Pertinent positives and negatives are stated within HPI, all other systems reviewed and are negative.    Past Medical History:  has a past medical history of Hyperlipidemia.  Surgical History:  has a past surgical history that includes Carotid artery surgery and Hand surgery (Left).  Social History:  reports that he has been smoking cigarettes. He has never used smokeless tobacco. He reports that he does not use drugs.  Family History: family history is not on file.   Allergies: Patient has no known allergies.  CURRENT MEDICATIONS       Previous Medications    ASPIRIN 81 MG EC TABLET    Take 1 tablet by mouth daily    ROSUVASTATIN CALCIUM (CRESTOR PO)    Take by mouth       SCREENINGS               CIWA Assessment  BP: 121/70  Pulse: 56       PHYSICAL EXAM   Oxygen Saturation Interpretation: Normal on room air analysis.        ED Triage Vitals   BP Systolic BP Percentile Diastolic BP Percentile Temp Temp src Pulse Respirations SpO2   25 0930 -- -- 25 0930 -- 25

## 2025-06-23 ENCOUNTER — OFFICE VISIT (OUTPATIENT)
Dept: VASCULAR SURGERY | Facility: CLINIC | Age: 65
End: 2025-06-23
Payer: COMMERCIAL

## 2025-06-23 ENCOUNTER — ANCILLARY PROCEDURE (OUTPATIENT)
Dept: VASCULAR MEDICINE | Facility: CLINIC | Age: 65
End: 2025-06-23
Payer: COMMERCIAL

## 2025-06-23 VITALS
WEIGHT: 230 LBS | RESPIRATION RATE: 18 BRPM | DIASTOLIC BLOOD PRESSURE: 88 MMHG | SYSTOLIC BLOOD PRESSURE: 127 MMHG | BODY MASS INDEX: 30.48 KG/M2 | HEIGHT: 73 IN | HEART RATE: 53 BPM | TEMPERATURE: 98.6 F

## 2025-06-23 DIAGNOSIS — I65.23 BILATERAL CAROTID ARTERY STENOSIS: ICD-10-CM

## 2025-06-23 DIAGNOSIS — I65.23 BILATERAL CAROTID ARTERY STENOSIS: Primary | ICD-10-CM

## 2025-06-23 PROCEDURE — 99212 OFFICE O/P EST SF 10 MIN: CPT | Performed by: SURGERY

## 2025-06-23 PROCEDURE — 3008F BODY MASS INDEX DOCD: CPT | Performed by: SURGERY

## 2025-06-23 PROCEDURE — 93880 EXTRACRANIAL BILAT STUDY: CPT | Performed by: SURGERY

## 2025-06-23 PROCEDURE — 4004F PT TOBACCO SCREEN RCVD TLK: CPT | Performed by: SURGERY

## 2025-06-23 PROCEDURE — 93880 EXTRACRANIAL BILAT STUDY: CPT

## 2025-06-23 ASSESSMENT — ENCOUNTER SYMPTOMS
OCCASIONAL FEELINGS OF UNSTEADINESS: 0
LOSS OF SENSATION IN FEET: 0
DEPRESSION: 0

## 2025-06-23 ASSESSMENT — PAIN SCALES - GENERAL: PAINLEVEL_OUTOF10: 0-NO PAIN

## 2025-06-23 ASSESSMENT — LIFESTYLE VARIABLES
SKIP TO QUESTIONS 9-10: 1
HOW OFTEN DO YOU HAVE SIX OR MORE DRINKS ON ONE OCCASION: NEVER
HOW MANY STANDARD DRINKS CONTAINING ALCOHOL DO YOU HAVE ON A TYPICAL DAY: PATIENT DOES NOT DRINK
HOW OFTEN DO YOU HAVE A DRINK CONTAINING ALCOHOL: NEVER
AUDIT-C TOTAL SCORE: 0

## 2025-06-23 NOTE — PROGRESS NOTES
Patient returns for follow-up of his carotid disease.  Back in March I performed a right carotid endarterectomy for severe disease on the side.  This went without complication.  He remains asymptomatic of significant stroke symptoms.  He does have a severe left internal carotid artery stenosis.  This is a more technically complicated side as the lesion starts in the proximal ICA and terminates in the distal ICA underneath his mandible.  It is severe and nearly occlusive on his CT angiogram although his duplex does show some flow.  This would qualify as a string sign.  He does take his aspirin.  Unfortunately he still smokes.  Illustrated below is his duplex from today on his right side which shows basically normal artery.  Neurologically he is intact and has normal phonation and normal upper and lower extremity motor function.          I would prefer holding off on any operation on the left side.  He is on maximal medical therapy.  His primary care physician will manage this.  He is to stay on his aspirin.  I reiterated the critical urgency of him quitting cigarettes.    I will recheck on him in 6 months with a carotid duplex.      Below is left ICA with string sign.

## 2025-09-23 ENCOUNTER — APPOINTMENT (OUTPATIENT)
Facility: CLINIC | Age: 65
End: 2025-09-23
Payer: COMMERCIAL

## 2025-10-22 ENCOUNTER — APPOINTMENT (OUTPATIENT)
Dept: PRIMARY CARE | Facility: CLINIC | Age: 65
End: 2025-10-22
Payer: COMMERCIAL

## 2026-04-29 ENCOUNTER — APPOINTMENT (OUTPATIENT)
Dept: PRIMARY CARE | Facility: CLINIC | Age: 66
End: 2026-04-29
Payer: COMMERCIAL

## (undated) DEVICE — APPLIER, LIGACLIP, MULTIPLE, SMALL 9-3/8IN

## (undated) DEVICE — DRAPE, INSTRUMENT, W/POUCH, STERI DRAPE, 7 X 11 IN, DISPOSABLE, STERILE

## (undated) DEVICE — APPLIER, MULTIPLE CLIP, LIGACLIP, W/20 MEDIUM, 11.5 APPLIER

## (undated) DEVICE — STRIP, SKIN CLOSURE, STERI STRIP, REINFORCED, 0.5 X 4 IN

## (undated) DEVICE — Device

## (undated) DEVICE — SUTURE, VICRYL, 3-0, 27 IN, SH

## (undated) DEVICE — LIGASURE EXACT DISSECTOR

## (undated) DEVICE — SUTURE, MONOCRYL, 3-0, 27 IN, PS-2, UNDYED

## (undated) DEVICE — LOOP, VESSEL, MAXI, RED

## (undated) DEVICE — CONTAINER, SPECIMEN, 4 OZ, OR PEEL PACK, STERILE

## (undated) DEVICE — SUTURE, VICRYL, 3-0, 27 IN, SH, VIOLET

## (undated) DEVICE — SOLUTION, IRRIGATION, X RX SODIUM CHL 0.9%, 1000ML BTL

## (undated) DEVICE — LOOP, VESSEL, MINI, WHITE

## (undated) DEVICE — ELECTRODE, INSULATED BLADE, EDGE, W/ 2.75 SLEEVE

## (undated) DEVICE — TAPE, POLYESTER, BRAIDED, PRE-CUT 2 X 30, WHITE"

## (undated) DEVICE — SOLUTION, INJECTION, USP, LACTATED RINGERS, LIFECARE, 1000ML

## (undated) DEVICE — APPLICATOR, PREP, ONE-STEP, ANTIMICROBIAL, CHLORAPREP, TINTED, 10.5ML

## (undated) DEVICE — SUTURE, SILK, 2-0, 18 IN, BLACK

## (undated) DEVICE — SHUNT KIT, CAROTID, BY-PASS, 8-14 FR X 6 IN

## (undated) DEVICE — SUTURE, PROLENE, 5-0, 36 IN, C1, DA, BLUE

## (undated) DEVICE — GLOVE, SURGICAL, BIOGEL, PI MICRO UNDER GLOVE, SZ 7.5, PF

## (undated) DEVICE — SUTURE, MONOCRYL, 4-0, 27 IN, PS-2, UNDYED

## (undated) DEVICE — DRAPE, INCISE, ANTIMICROBIAL, IOBAN 2, LARGE, 17 X 23 IN, DISPOSABLE, STERILE

## (undated) DEVICE — NEEDLE, HYPODERMIC, NEEDLE PRO, 25G X 1.5, ORANGE

## (undated) DEVICE — SPONGE, GAUZE, AVANT, STERILE, NONWOVEN, 4PLY, 4 X 4, STANDARD

## (undated) DEVICE — SOLUTION, INJECTION, SODIUM CHLORIDE 9%, 500ML

## (undated) DEVICE — SPONGE GAUZE, XRAY RFD, 8X4 12 PLY

## (undated) DEVICE — DRAPE, PAD, INSTRUMENT, MAGNETIC, MEDIUM, 10 X 16 IN, DISPOSABLE

## (undated) DEVICE — SUTURE, PROLENE, 6-0, 24 IN, BV1, DA, BLUE